# Patient Record
Sex: FEMALE | Race: BLACK OR AFRICAN AMERICAN | NOT HISPANIC OR LATINO | URBAN - METROPOLITAN AREA
[De-identification: names, ages, dates, MRNs, and addresses within clinical notes are randomized per-mention and may not be internally consistent; named-entity substitution may affect disease eponyms.]

---

## 2023-12-30 ENCOUNTER — INPATIENT (INPATIENT)
Facility: HOSPITAL | Age: 28
LOS: 0 days | Discharge: HOME | DRG: 832 | End: 2023-12-31
Attending: OBSTETRICS & GYNECOLOGY | Admitting: OBSTETRICS & GYNECOLOGY
Payer: MEDICAID

## 2023-12-30 VITALS
OXYGEN SATURATION: 100 % | HEART RATE: 85 BPM | TEMPERATURE: 98 F | SYSTOLIC BLOOD PRESSURE: 99 MMHG | RESPIRATION RATE: 17 BRPM | DIASTOLIC BLOOD PRESSURE: 59 MMHG

## 2023-12-30 DIAGNOSIS — D64.9 ANEMIA, UNSPECIFIED: ICD-10-CM

## 2023-12-30 DIAGNOSIS — E46 UNSPECIFIED PROTEIN-CALORIE MALNUTRITION: ICD-10-CM

## 2023-12-30 DIAGNOSIS — O09.299 SUPERVISION OF PREGNANCY WITH OTHER POOR REPRODUCTIVE OR OBSTETRIC HISTORY, UNSPECIFIED TRIMESTER: ICD-10-CM

## 2023-12-30 DIAGNOSIS — O99.019 ANEMIA COMPLICATING PREGNANCY, UNSPECIFIED TRIMESTER: ICD-10-CM

## 2023-12-30 DIAGNOSIS — Z3A.25 25 WEEKS GESTATION OF PREGNANCY: ICD-10-CM

## 2023-12-30 DIAGNOSIS — Z98.890 OTHER SPECIFIED POSTPROCEDURAL STATES: Chronic | ICD-10-CM

## 2023-12-30 DIAGNOSIS — O26.899 OTHER SPECIFIED PREGNANCY RELATED CONDITIONS, UNSPECIFIED TRIMESTER: ICD-10-CM

## 2023-12-30 DIAGNOSIS — Z34.80 ENCOUNTER FOR SUPERVISION OF OTHER NORMAL PREGNANCY, UNSPECIFIED TRIMESTER: ICD-10-CM

## 2023-12-30 LAB
ABO RH CONFIRMATION: SIGNIFICANT CHANGE UP
ABO RH CONFIRMATION: SIGNIFICANT CHANGE UP
ALBUMIN SERPL ELPH-MCNC: 2.7 G/DL — LOW (ref 3.5–5)
ALBUMIN SERPL ELPH-MCNC: 2.7 G/DL — LOW (ref 3.5–5)
ALP SERPL-CCNC: 51 U/L — SIGNIFICANT CHANGE UP (ref 40–120)
ALP SERPL-CCNC: 51 U/L — SIGNIFICANT CHANGE UP (ref 40–120)
ALT FLD-CCNC: 9 U/L DA — LOW (ref 10–60)
ALT FLD-CCNC: 9 U/L DA — LOW (ref 10–60)
ANION GAP SERPL CALC-SCNC: 8 MMOL/L — SIGNIFICANT CHANGE UP (ref 5–17)
ANION GAP SERPL CALC-SCNC: 8 MMOL/L — SIGNIFICANT CHANGE UP (ref 5–17)
ANISOCYTOSIS BLD QL: SIGNIFICANT CHANGE UP
ANISOCYTOSIS BLD QL: SIGNIFICANT CHANGE UP
APPEARANCE UR: ABNORMAL
APPEARANCE UR: ABNORMAL
APTT BLD: 24.3 SEC — LOW (ref 24.5–35.6)
APTT BLD: 24.3 SEC — LOW (ref 24.5–35.6)
AST SERPL-CCNC: 14 U/L — SIGNIFICANT CHANGE UP (ref 10–40)
AST SERPL-CCNC: 14 U/L — SIGNIFICANT CHANGE UP (ref 10–40)
BACTERIA # UR AUTO: ABNORMAL /HPF
BACTERIA # UR AUTO: ABNORMAL /HPF
BASOPHILS # BLD AUTO: 0.02 K/UL — SIGNIFICANT CHANGE UP (ref 0–0.2)
BASOPHILS # BLD AUTO: 0.02 K/UL — SIGNIFICANT CHANGE UP (ref 0–0.2)
BASOPHILS NFR BLD AUTO: 0.3 % — SIGNIFICANT CHANGE UP (ref 0–2)
BASOPHILS NFR BLD AUTO: 0.3 % — SIGNIFICANT CHANGE UP (ref 0–2)
BILIRUB SERPL-MCNC: 0.4 MG/DL — SIGNIFICANT CHANGE UP (ref 0.2–1.2)
BILIRUB SERPL-MCNC: 0.4 MG/DL — SIGNIFICANT CHANGE UP (ref 0.2–1.2)
BILIRUB UR-MCNC: NEGATIVE — SIGNIFICANT CHANGE UP
BILIRUB UR-MCNC: NEGATIVE — SIGNIFICANT CHANGE UP
BLD GP AB SCN SERPL QL: SIGNIFICANT CHANGE UP
BLD GP AB SCN SERPL QL: SIGNIFICANT CHANGE UP
BUN SERPL-MCNC: 4 MG/DL — LOW (ref 7–18)
BUN SERPL-MCNC: 4 MG/DL — LOW (ref 7–18)
CALCIUM SERPL-MCNC: 8.3 MG/DL — LOW (ref 8.4–10.5)
CALCIUM SERPL-MCNC: 8.3 MG/DL — LOW (ref 8.4–10.5)
CHLORIDE SERPL-SCNC: 108 MMOL/L — SIGNIFICANT CHANGE UP (ref 96–108)
CHLORIDE SERPL-SCNC: 108 MMOL/L — SIGNIFICANT CHANGE UP (ref 96–108)
CO2 SERPL-SCNC: 21 MMOL/L — LOW (ref 22–31)
CO2 SERPL-SCNC: 21 MMOL/L — LOW (ref 22–31)
COLOR SPEC: YELLOW — SIGNIFICANT CHANGE UP
COLOR SPEC: YELLOW — SIGNIFICANT CHANGE UP
CREAT SERPL-MCNC: 0.42 MG/DL — LOW (ref 0.5–1.3)
CREAT SERPL-MCNC: 0.42 MG/DL — LOW (ref 0.5–1.3)
DIFF PNL FLD: NEGATIVE — SIGNIFICANT CHANGE UP
DIFF PNL FLD: NEGATIVE — SIGNIFICANT CHANGE UP
EGFR: 137 ML/MIN/1.73M2 — SIGNIFICANT CHANGE UP
EGFR: 137 ML/MIN/1.73M2 — SIGNIFICANT CHANGE UP
EOSINOPHIL # BLD AUTO: 0.07 K/UL — SIGNIFICANT CHANGE UP (ref 0–0.5)
EOSINOPHIL # BLD AUTO: 0.07 K/UL — SIGNIFICANT CHANGE UP (ref 0–0.5)
EOSINOPHIL NFR BLD AUTO: 1.1 % — SIGNIFICANT CHANGE UP (ref 0–6)
EOSINOPHIL NFR BLD AUTO: 1.1 % — SIGNIFICANT CHANGE UP (ref 0–6)
EPI CELLS # UR: PRESENT
EPI CELLS # UR: PRESENT
FIBRINOGEN PPP-MCNC: 305 MG/DL — SIGNIFICANT CHANGE UP (ref 200–475)
FIBRINOGEN PPP-MCNC: 305 MG/DL — SIGNIFICANT CHANGE UP (ref 200–475)
GLUCOSE SERPL-MCNC: 68 MG/DL — LOW (ref 70–99)
GLUCOSE SERPL-MCNC: 68 MG/DL — LOW (ref 70–99)
GLUCOSE UR QL: NEGATIVE MG/DL — SIGNIFICANT CHANGE UP
GLUCOSE UR QL: NEGATIVE MG/DL — SIGNIFICANT CHANGE UP
HCT VFR BLD CALC: 25.7 % — LOW (ref 34.5–45)
HCT VFR BLD CALC: 25.7 % — LOW (ref 34.5–45)
HGB BLD-MCNC: 7 G/DL — CRITICAL LOW (ref 11.5–15.5)
HGB BLD-MCNC: 7 G/DL — CRITICAL LOW (ref 11.5–15.5)
HYPOCHROMIA BLD QL: SLIGHT — SIGNIFICANT CHANGE UP
HYPOCHROMIA BLD QL: SLIGHT — SIGNIFICANT CHANGE UP
IMM GRANULOCYTES NFR BLD AUTO: 0.3 % — SIGNIFICANT CHANGE UP (ref 0–0.9)
IMM GRANULOCYTES NFR BLD AUTO: 0.3 % — SIGNIFICANT CHANGE UP (ref 0–0.9)
INR BLD: 0.94 RATIO — SIGNIFICANT CHANGE UP (ref 0.85–1.18)
INR BLD: 0.94 RATIO — SIGNIFICANT CHANGE UP (ref 0.85–1.18)
KETONES UR-MCNC: 15 MG/DL
KETONES UR-MCNC: 15 MG/DL
LEUKOCYTE ESTERASE UR-ACNC: ABNORMAL
LEUKOCYTE ESTERASE UR-ACNC: ABNORMAL
LYMPHOCYTES # BLD AUTO: 1.86 K/UL — SIGNIFICANT CHANGE UP (ref 1–3.3)
LYMPHOCYTES # BLD AUTO: 1.86 K/UL — SIGNIFICANT CHANGE UP (ref 1–3.3)
LYMPHOCYTES # BLD AUTO: 29.7 % — SIGNIFICANT CHANGE UP (ref 13–44)
LYMPHOCYTES # BLD AUTO: 29.7 % — SIGNIFICANT CHANGE UP (ref 13–44)
MACROCYTES BLD QL: SLIGHT — SIGNIFICANT CHANGE UP
MACROCYTES BLD QL: SLIGHT — SIGNIFICANT CHANGE UP
MANUAL SMEAR VERIFICATION: SIGNIFICANT CHANGE UP
MANUAL SMEAR VERIFICATION: SIGNIFICANT CHANGE UP
MCHC RBC-ENTMCNC: 18 PG — LOW (ref 27–34)
MCHC RBC-ENTMCNC: 18 PG — LOW (ref 27–34)
MCHC RBC-ENTMCNC: 27.2 GM/DL — LOW (ref 32–36)
MCHC RBC-ENTMCNC: 27.2 GM/DL — LOW (ref 32–36)
MCV RBC AUTO: 66.1 FL — LOW (ref 80–100)
MCV RBC AUTO: 66.1 FL — LOW (ref 80–100)
MICROCYTES BLD QL: SIGNIFICANT CHANGE UP
MICROCYTES BLD QL: SIGNIFICANT CHANGE UP
MONOCYTES # BLD AUTO: 0.64 K/UL — SIGNIFICANT CHANGE UP (ref 0–0.9)
MONOCYTES # BLD AUTO: 0.64 K/UL — SIGNIFICANT CHANGE UP (ref 0–0.9)
MONOCYTES NFR BLD AUTO: 10.2 % — SIGNIFICANT CHANGE UP (ref 2–14)
MONOCYTES NFR BLD AUTO: 10.2 % — SIGNIFICANT CHANGE UP (ref 2–14)
NEUTROPHILS # BLD AUTO: 3.66 K/UL — SIGNIFICANT CHANGE UP (ref 1.8–7.4)
NEUTROPHILS # BLD AUTO: 3.66 K/UL — SIGNIFICANT CHANGE UP (ref 1.8–7.4)
NEUTROPHILS NFR BLD AUTO: 58.4 % — SIGNIFICANT CHANGE UP (ref 43–77)
NEUTROPHILS NFR BLD AUTO: 58.4 % — SIGNIFICANT CHANGE UP (ref 43–77)
NITRITE UR-MCNC: NEGATIVE — SIGNIFICANT CHANGE UP
NITRITE UR-MCNC: NEGATIVE — SIGNIFICANT CHANGE UP
NRBC # BLD: 0 /100 WBCS — SIGNIFICANT CHANGE UP (ref 0–0)
NRBC # BLD: 0 /100 WBCS — SIGNIFICANT CHANGE UP (ref 0–0)
OVALOCYTES BLD QL SMEAR: SLIGHT — SIGNIFICANT CHANGE UP
OVALOCYTES BLD QL SMEAR: SLIGHT — SIGNIFICANT CHANGE UP
PH UR: 7.5 — SIGNIFICANT CHANGE UP (ref 5–8)
PH UR: 7.5 — SIGNIFICANT CHANGE UP (ref 5–8)
PLAT MORPH BLD: NORMAL — SIGNIFICANT CHANGE UP
PLAT MORPH BLD: NORMAL — SIGNIFICANT CHANGE UP
PLATELET # BLD AUTO: 256 K/UL — SIGNIFICANT CHANGE UP (ref 150–400)
PLATELET # BLD AUTO: 256 K/UL — SIGNIFICANT CHANGE UP (ref 150–400)
PLATELET COUNT - ESTIMATE: NORMAL — SIGNIFICANT CHANGE UP
PLATELET COUNT - ESTIMATE: NORMAL — SIGNIFICANT CHANGE UP
POIKILOCYTOSIS BLD QL AUTO: SLIGHT — SIGNIFICANT CHANGE UP
POIKILOCYTOSIS BLD QL AUTO: SLIGHT — SIGNIFICANT CHANGE UP
POTASSIUM SERPL-MCNC: 3.2 MMOL/L — LOW (ref 3.5–5.3)
POTASSIUM SERPL-MCNC: 3.2 MMOL/L — LOW (ref 3.5–5.3)
POTASSIUM SERPL-SCNC: 3.2 MMOL/L — LOW (ref 3.5–5.3)
POTASSIUM SERPL-SCNC: 3.2 MMOL/L — LOW (ref 3.5–5.3)
PROT SERPL-MCNC: 6.4 G/DL — SIGNIFICANT CHANGE UP (ref 6–8.3)
PROT SERPL-MCNC: 6.4 G/DL — SIGNIFICANT CHANGE UP (ref 6–8.3)
PROT UR-MCNC: ABNORMAL MG/DL
PROT UR-MCNC: ABNORMAL MG/DL
PROTHROM AB SERPL-ACNC: 10.7 SEC — SIGNIFICANT CHANGE UP (ref 9.5–13)
PROTHROM AB SERPL-ACNC: 10.7 SEC — SIGNIFICANT CHANGE UP (ref 9.5–13)
RBC # BLD: 3.89 M/UL — SIGNIFICANT CHANGE UP (ref 3.8–5.2)
RBC # BLD: 3.89 M/UL — SIGNIFICANT CHANGE UP (ref 3.8–5.2)
RBC # FLD: 17.9 % — HIGH (ref 10.3–14.5)
RBC # FLD: 17.9 % — HIGH (ref 10.3–14.5)
RBC BLD AUTO: ABNORMAL
RBC BLD AUTO: ABNORMAL
RBC CASTS # UR COMP ASSIST: 2 /HPF — SIGNIFICANT CHANGE UP (ref 0–4)
RBC CASTS # UR COMP ASSIST: 2 /HPF — SIGNIFICANT CHANGE UP (ref 0–4)
SODIUM SERPL-SCNC: 137 MMOL/L — SIGNIFICANT CHANGE UP (ref 135–145)
SODIUM SERPL-SCNC: 137 MMOL/L — SIGNIFICANT CHANGE UP (ref 135–145)
SP GR SPEC: 1.02 — SIGNIFICANT CHANGE UP (ref 1–1.03)
SP GR SPEC: 1.02 — SIGNIFICANT CHANGE UP (ref 1–1.03)
UROBILINOGEN FLD QL: 1 MG/DL — SIGNIFICANT CHANGE UP (ref 0.2–1)
UROBILINOGEN FLD QL: 1 MG/DL — SIGNIFICANT CHANGE UP (ref 0.2–1)
WBC # BLD: 6.27 K/UL — SIGNIFICANT CHANGE UP (ref 3.8–10.5)
WBC # BLD: 6.27 K/UL — SIGNIFICANT CHANGE UP (ref 3.8–10.5)
WBC # FLD AUTO: 6.27 K/UL — SIGNIFICANT CHANGE UP (ref 3.8–10.5)
WBC # FLD AUTO: 6.27 K/UL — SIGNIFICANT CHANGE UP (ref 3.8–10.5)
WBC UR QL: 8 /HPF — HIGH (ref 0–5)
WBC UR QL: 8 /HPF — HIGH (ref 0–5)

## 2023-12-30 PROCEDURE — 76817 TRANSVAGINAL US OBSTETRIC: CPT | Mod: 26

## 2023-12-30 PROCEDURE — 76819 FETAL BIOPHYS PROFIL W/O NST: CPT | Mod: 26

## 2023-12-30 RX ORDER — POTASSIUM CHLORIDE 20 MEQ
40 PACKET (EA) ORAL EVERY 4 HOURS
Refills: 0 | Status: COMPLETED | OUTPATIENT
Start: 2023-12-30 | End: 2023-12-31

## 2023-12-30 RX ORDER — SODIUM CHLORIDE 9 MG/ML
1000 INJECTION, SOLUTION INTRAVENOUS
Refills: 0 | Status: DISCONTINUED | OUTPATIENT
Start: 2023-12-30 | End: 2023-12-30

## 2023-12-30 RX ORDER — SODIUM CHLORIDE 9 MG/ML
1000 INJECTION, SOLUTION INTRAVENOUS ONCE
Refills: 0 | Status: COMPLETED | OUTPATIENT
Start: 2023-12-30 | End: 2023-12-30

## 2023-12-30 RX ORDER — ACETAMINOPHEN 500 MG
975 TABLET ORAL ONCE
Refills: 0 | Status: COMPLETED | OUTPATIENT
Start: 2023-12-30 | End: 2023-12-30

## 2023-12-30 RX ORDER — SODIUM CHLORIDE 9 MG/ML
1000 INJECTION, SOLUTION INTRAVENOUS ONCE
Refills: 0 | Status: DISCONTINUED | OUTPATIENT
Start: 2023-12-30 | End: 2023-12-30

## 2023-12-30 RX ADMIN — Medication 975 MILLIGRAM(S): at 21:50

## 2023-12-30 RX ADMIN — Medication 975 MILLIGRAM(S): at 22:50

## 2023-12-30 RX ADMIN — SODIUM CHLORIDE 1000 MILLILITER(S): 9 INJECTION, SOLUTION INTRAVENOUS at 17:45

## 2023-12-30 RX ADMIN — Medication 40 MILLIEQUIVALENT(S): at 22:07

## 2023-12-30 NOTE — PROGRESS NOTE ADULT - SUBJECTIVE AND OBJECTIVE BOX
HD#1 admitted antepartum  - for blood transfusion x2 units due to THERESA during pregnancy  has not seen heme outpt    - pt seen at bedside no evidence of labor pains/vag bleeding/LOF  reports +FM    doing well    Vital Signs Last 24 Hrs  T(C): 36.8 (30 Dec 2023 16:37), Max: 36.8 (30 Dec 2023 16:37)  T(F): 98.2 (30 Dec 2023 16:37), Max: 98.2 (30 Dec 2023 16:37)  HR: 85 (30 Dec 2023 16:37) (85 - 99)  BP: 99/59 (30 Dec 2023 16:37) (99/59 - 104/67)  BP(mean): --  RR: 17 (30 Dec 2023 16:37) (17 - 18)  SpO2: 100% (30 Dec 2023 16:37) (100% - 100%)    Parameters below as of 30 Dec 2023 16:37  Patient On (Oxygen Delivery Method): room air

## 2023-12-30 NOTE — OB PROVIDER TRIAGE NOTE - NSOBPROVIDERNOTE_OBGYN_ALL_OB_FT
27yo  @25.5 weeks, DANIEL 24, LMP 23 presenting with symptomatic anemia, pt stable   - NST reactive,  testing reassuring  - BPP, OB transvaginal for cervical length   - cbc, coags, cmp, type and screen, ua and urine culture   - cont close maternal and fetal monitoring  - discussed the importance of patient being evaluated by a hematologist for full workup and likely venofer infusions  - gave patient information for [facility] clinic   - fall risk, pt to ambulate with assistance  - ivf hydration   - d/w dr. Guevara, annabel attending 27yo  @25.5 weeks, DANIEL 24, LMP 23 presenting with symptomatic anemia, pt stable   - NST reactive,  testing reassuring  - BPP, OB transvaginal for cervical length   - cbc, coags, cmp, type and screen, ua and urine culture   - cont close maternal and fetal monitoring  - discussed the importance of patient being evaluated by a hematologist for full workup and likely venofer infusions  - gave patient information for [facility] clinic   - fall risk, pt to ambulate with assistance  - ivf hydration   - d/w dr. Guevara, annabel attending 29yo  @25.5 weeks, DANIEL 24, LMP 23 presenting with symptomatic anemia, h/o poor ob outcome (IUFD ), pt stable   - NST reactive,  testing reassuring  - BPP, OB transvaginal for cervical length   - cbc, coags, cmp, type and screen, ua and urine culture   - h/o electrolyte abnormalities probably secondary to poor po intake and protein calorie malnutrition   - cont close maternal and fetal monitoring  - discussed the importance of patient being evaluated by a hematologist for full workup and likely venofer infusions  - gave patient information for [facility] clinic   - fall risk, pt to ambulate with assistance  - ivf hydration   - d/w dr. Guevara, house attending 27yo  @25.5 weeks, DANIEL 24, LMP 23 presenting with symptomatic anemia, h/o poor ob outcome (IUFD ), pt stable   - NST reactive,  testing reassuring  - BPP, OB transvaginal for cervical length   - cbc, coags, cmp, type and screen, ua and urine culture   - h/o electrolyte abnormalities probably secondary to poor po intake and protein calorie malnutrition   - cont close maternal and fetal monitoring  - discussed the importance of patient being evaluated by a hematologist for full workup and likely venofer infusions  - gave [facility]   - fall risk, pt to ambulate with assistance  - ivf hydration   - d/w dr. Guevara, house attending

## 2023-12-30 NOTE — OB PROVIDER H&P - HISTORY OF PRESENT ILLNESS
29yo  @25.5 weeks, DANIEL 24, LMP 23 presenting to triage because she is feeling "woozy." Patient has a history of chronic anemia with hemoglobin fluctuating between 6.5-7.5 on po ferrous sulfate BID, never saw hematology. She was called on thursday and instructed that her hemoglobin is 7 and if she develops any signs/symptoms of anemia to go directly to the hospital. She states that all day today she is feeling intermittent dizziness, shortness of breath, palpitations and is extremely tired. Denies loss of consciousness or falling. She has also had a decreased appetite and forces herself to eat at least once a day, last meal last night. Reports that she is constantly thirsty and drinks about 4 large seven cups daily. Admits to feeling abdominal tightening intermittently with walking, not painful.  Admits to normal fetal movement. Denies vaginal bleeding, leakage of fluid, contractions or any other concerns  Denies fever, chills, headache, visual changes, chest pain, abdominal/pelvic pain, lower extremity pain  or swelling     PNC: [facility] - chronic anemia not followed by hematology, hemoglobin fluctuates between 6-7  OBHx:   (1) 2012 , female, full term, 7lb, uncomplicated   (2) 1/3/2018 , male, full term, 7lb, mild anemia otherwise uncomplicated   (3)  IUFD @25wks due to severe anemia, s/p vaginal delivery and d&c for retained placenta, did not receive blood transfusion because was asymptomatic at that time, otherwise uncomplicated  (4) 3/22/2021 , male, full term, 8lb 11oz, complicated by acute on chronic anemia requiring 2u prbc blood transfusion postpartum, otherwise uncomplicated   GYNHx: denies fibroids, cyst, sti, abnormal paps   PMHx: chronic anemia   Meds: iron bid, pnv (denies ever receiving venofer)  Allergies: nka   PSHx: d&c as noted above   SocialHx: denies h/o tobacco/alcohol/drug use   PsychHx: denies h/o anxiety, depression, ptsd, trauma or abuse     Ht: 5'1   Wt: 140 (weight before pregnancy 150)  reviewed limited prenatal labs on patients phone   cbc 23: 6/7.5/27.5/301  cmp notable for K 3.3, hypophosphatemia, hypoalbuminemia, hypomagnesemia, hypoCalcemia  27yo  @25.5 weeks, DANIEL 24, LMP 23 presenting to triage because she is feeling "woozy." Patient has a history of chronic anemia with hemoglobin fluctuating between 6.5-7.5 on po ferrous sulfate BID, never saw hematology. She was called on thursday and instructed that her hemoglobin is 7 and if she develops any signs/symptoms of anemia to go directly to the hospital. She states that all day today she is feeling intermittent dizziness, shortness of breath, palpitations and is extremely tired. Denies loss of consciousness or falling. She has also had a decreased appetite and forces herself to eat at least once a day, last meal last night. Reports that she is constantly thirsty and drinks about 4 large seven cups daily. Admits to feeling abdominal tightening intermittently with walking, not painful.  Admits to normal fetal movement. Denies vaginal bleeding, leakage of fluid, contractions or any other concerns  Denies fever, chills, headache, visual changes, chest pain, abdominal/pelvic pain, lower extremity pain  or swelling     PNC: [facility] - chronic anemia not followed by hematology, hemoglobin fluctuates between 6-7  OBHx:   (1) 2012 , female, full term, 7lb, uncomplicated   (2) 1/3/2018 , male, full term, 7lb, mild anemia otherwise uncomplicated   (3)  IUFD @25wks due to severe anemia, s/p vaginal delivery and d&c for retained placenta, did not receive blood transfusion because was asymptomatic at that time, otherwise uncomplicated  (4) 3/22/2021 , male, full term, 8lb 11oz, complicated by acute on chronic anemia requiring 2u prbc blood transfusion postpartum, otherwise uncomplicated   GYNHx: denies fibroids, cyst, sti, abnormal paps   PMHx: chronic anemia   Meds: iron bid, pnv (denies ever receiving venofer)  Allergies: nka   PSHx: d&c as noted above   SocialHx: denies h/o tobacco/alcohol/drug use   PsychHx: denies h/o anxiety, depression, ptsd, trauma or abuse     Ht: 5'1   Wt: 140 (weight before pregnancy 150)  reviewed limited prenatal labs on patients phone   cbc 23: 6/7.5/27.5/301  cmp notable for K 3.3, hypophosphatemia, hypoalbuminemia, hypomagnesemia, hypoCalcemia

## 2023-12-30 NOTE — OB PROVIDER H&P - ABORTIONS, OB PROFILE
Procedure:    [] Facet Joint Injection           Post Procedure Instructions:   Activity:  • DO NOT work, drive a car, stay alone, or operate machinery or electrical/power tools or appliances today.  • Activity as tolerated.  • Resume your pre-procedure activity or restrictions tomorrow.    Dressing/Incision Site:   • Keep injection site clean and dry.  • You may shower/bathe tomorrow.  • You may use an ice pack at the injection site for the next 24 hours while awake.  The ice pack should only be used for 20 minutes every 2 hours.    Special Instructions:   • DO NOT DRINK ALCOHOL TODAY due to the medication given during the procedure.  • Resume your pre-procedure diet.  • Continue your medications unless otherwise instructed.  · You will most likely have continued pain after the procedure; continue your usual pain medications unless otherwise instructed.  · [] For Diabetic Steroid Injection Patients:  Diabetic patients may experience higher glucose levels after a steroid injection.  Diabetic patients should monitor their blood sugar for at least one week after injection.  Insulin or medication adjustment may be necessary.  Please contact your primary care provider with any questions/concerns.  ·   Call [phone] if you develop any of the following:  • Drainage, increase in redness, and/or swelling from the injection site.  • Temperature above 101oF.  • Increased numbness or weakness of your legs or arms different than before the injection.  • Severe headache.                    
Bilateral
1

## 2023-12-30 NOTE — OB RN TRIAGE NOTE - FALL HARM RISK - UNIVERSAL INTERVENTIONS
Bed in lowest position, wheels locked, appropriate side rails in place/Call bell, personal items and telephone in reach/Instruct patient to call for assistance before getting out of bed or chair/Non-slip footwear when patient is out of bed/[place] to call system/Physically safe environment - no spills, clutter or unnecessary equipment/Purposeful Proactive Rounding/Room/bathroom lighting operational, light cord in reach Bed in lowest position, wheels locked, appropriate side rails in place/Call bell, personal items and telephone in reach/Instruct patient to call for assistance before getting out of bed or chair/Non-slip footwear when patient is out of bed/[place] to call system/Physically safe environment - no spills, clutter or unnecessary equipment/Purposeful Proactive Rounding/Room/bathroom lighting operational, light cord in reach

## 2023-12-30 NOTE — OB PROVIDER H&P - ASSESSMENT
27yo  @25.5 weeks, DANIEL 24, LMP 23 presenting with symptomatic anemia, h/o poor ob outcome (IUFD ), pt stable   - NST reactive,  testing reassuring  - BPP   - 2 units prbc ordered for symptomatic anemia, blood consent obtained  - cmp notable for hypokalemia po potassium ordered  - regular diet   - h/o electrolyte abnormalities probably secondary to poor po intake and protein calorie malnutrition - an extensive conversation was had in regards to the minimum calorie requirements during pregnancy and patient was encouraged to eat small frwuent meals and hydrate   - cont close maternal and fetal monitoring  - discussed the importance of patient being evaluated by a hematologist for full workup and likely venofer infusions  - gave patient information for [facility] clinic   - fall risk, pt to ambulate with assistance  - ivf hydration   - d/w dr. Guevara, annabel attending   29yo  @25.5 weeks, DANIEL 24, LMP 23 presenting with symptomatic anemia, h/o poor ob outcome (IUFD ), pt stable   - NST reactive,  testing reassuring  - BPP   - 2 units prbc ordered for symptomatic anemia, blood consent obtained  - cmp notable for hypokalemia po potassium ordered  - regular diet   - h/o electrolyte abnormalities probably secondary to poor po intake and protein calorie malnutrition - an extensive conversation was had in regards to the minimum calorie requirements during pregnancy and patient was encouraged to eat small frwuent meals and hydrate   - cont close maternal and fetal monitoring  - discussed the importance of patient being evaluated by a hematologist for full workup and likely venofer infusions  - gave patient information for [facility] clinic   - fall risk, pt to ambulate with assistance  - ivf hydration   - d/w dr. Guevara, annabel attending

## 2023-12-30 NOTE — OB PROVIDER H&P - NSHPLABSRESULTS_GEN_ALL_CORE
Hemoglobin: 7.0: TYPE:(C=Critical, N=Notification, A=Abnormal) C   < from: US Fetal Bio Profile w/o Non-Stress (12.30.23 @ 17:46) >    Single live Intrauterine gestation is present.  Fetal position: Variable presentation.  Placenta location: Anterior.  Amniotic fluid volume: DAYAMI 19.2 cm.  Cervical length: 5.2 cm by transvaginal technique.  Fetal motion is seen in real-time and the fetal heart rate measures 162   bpm.    Fetal anatomy and umbilical cord were not evaluated.    Fetal biometry not performed.    The biophysical profile is 8/8.    Additional comments: None.    IMPRESSION:  Single live intrauterine gestation.  Biophysical profile score is 8 out of 8.  Cervical length is 5.2 cm.    Please note that this study was not optimized for the evaluation of fetal  anatomy and umbilical cord which should be performed on a separate basis   as clinically indicated.  Labs: cbc, coags, cmp, type and screen, ferritin, UA, Urine culture

## 2023-12-30 NOTE — OB PROVIDER TRIAGE NOTE - NSHPPHYSICALEXAM_GEN_ALL_CORE
gen: aox3, nad, pallor, appears well but tired   abd: gravid, soft, nontender, no guarding or rigidity  sve: deferred   ext: no edema, no calf tenderness    fht baseline 140, moderate variability, appropriate for gestational age   toco none     Vital Signs Last 24 Hrs  T(F): 97.9 (12-30-23 @ 16:02), Max: 97.9 (12-30-23 @ 16:02)  HR: 85 (12-30-23 @ 16:37) (85 - 99)  BP: 99/59 (12-30-23 @ 16:37) (99/59 - 104/67)  RR: 17 (12-30-23 @ 16:37) (17 - 18)  SpO2: 100% (12-30-23 @ 16:37) (100% - 100%)

## 2023-12-30 NOTE — OB RN TRIAGE NOTE - CHIEF COMPLAINT QUOTE
my doctor sent me in because my hemoglobin is not going up. and she said she is concerned that im pale

## 2023-12-30 NOTE — PROGRESS NOTE ADULT - SUBJECTIVE AND OBJECTIVE BOX
Progress Note:   · Provider Specialty	OB      · Subjective and Objective:   HD#1 admitted antepartum  - for blood transfusion x2 units due to THERESA during pregnancy  has not seen heme outpt    - pt seen at bedside no evidence of labor pains/vag bleeding/LOF  reports +FM    doing well    Vital Signs Last 24 Hrs  T(C): 36.8 (30 Dec 2023 16:37), Max: 36.8 (30 Dec 2023 16:37)  T(F): 98.2 (30 Dec 2023 16:37), Max: 98.2 (30 Dec 2023 16:37)  HR: 85 (30 Dec 2023 16:37) (85 - 99)  BP: 99/59 (30 Dec 2023 16:37) (99/59 - 104/67)  BP(mean): --  RR: 17 (30 Dec 2023 16:37) (17 - 18)  SpO2: 100% (30 Dec 2023 16:37) (100% - 100%)    Parameters below as of 30 Dec 2023 16:37  Patient On (Oxygen Delivery Method): room air    pt alert and oriented x3  not in acute distress    skin warm dry +pallor  abd gravid soft NT no guarding no rebound relaxed uterus  ve deferred  extrem no edema b/l    continue blood transfusion as ordered x2 units then cbc post transfusion  potassium replacement for hypokalemia

## 2023-12-30 NOTE — OB PROVIDER H&P - NSLOWPPHRISK_OBGYN_A_OB
No previous uterine incision/[place] Pregnancy/Less than or equal to 4 previous vaginal births/No known bleeding disorder/No history of postpartum hemorrhage/No other PPH risks indicated

## 2023-12-30 NOTE — OB PROVIDER H&P - PROBLEM/PLAN-1
"Problem: Alcohol Withdrawal  Goal: Alcohol Withdrawal Symptom Control  Outcome: No Change    Patient is up and visible in the milieu. Patient is ambulating balanced and steady. Patient is alert and oriented x 4. Patient is attending/participating in unit programming. Pt is social with peers. Affect is blunted/flat, mood is calm. Patient denies SI/SIB/HI. Pt denies auditory/visual hallucinations. Patient verbally contracts for safety on the unit. Patient is slow to respond during interview, but is clear in his responses.     This RN administered the PRN medications Zofran 4mg ODT x 1 for nausea, (see MAR) at the request of the patient. Patient is tolerating medications well, denies any current side effects.     Pt's MSSA = 3, 3 this shift. Patient is not tremulous, not diaphoretic, and exhibiting minimal withdrawal symptoms at this time. Patient reports a fair appetite, and fair sleep. Patient discharge plans pending. Rest, fluids, and food encouraged. Status 15 checks remain. Patient denies any unmet needs at this time.     Blood pressure 116/78, pulse 57, temperature 97.6  F (36.4  C), temperature source Temporal, resp. rate 16, height 1.778 m (5' 10\"), weight 79.4 kg (175 lb), SpO2 97 %.     Patient has run bradycardic this shift (pulses 43, 43). Internal medicine PA, Madeleine Payan, notified. Patient pulses re-checked each time for values of (pulses 53, 57). Patient is asymptomatic at this time.   " DISPLAY PLAN FREE TEXT

## 2023-12-30 NOTE — OB PROVIDER TRIAGE NOTE - HISTORY OF PRESENT ILLNESS
27yo  @25.5 weeks, DANIEL 24, LMP 23 presenting to triage because she is feeling "woozy." Patient has a history of chronic anemia with hemoglobin fluctuating between 6.5-7.5 on po ferrous sulfate BID, never saw hematology. She was called on thursday and instructed that her hemoglobin is 7 and if she develops any signs/symptoms of anemia to go directly to the hospital. She states that all day today she is feeling intermittent dizziness, shortness of breath, palpitations and is extremely tired. Denies loss of consciousness or falling. She has also had a decreased appetite and forces herself to eat at least once a day, last meal last night. Reports that she is constantly thirsty and drinks about 4 large seven cups daily. Admits to feeling abdominal tightening intermittently with walking, not painful.  Admits to normal fetal movement. Denies vaginal bleeding, leakage of fluid, contractions or any other concerns  Denies fever, chills, headache, visual changes, chest pain, abdominal/pelvic pain, lower extremity pain  or swelling     PNC: [facility] - chronic anemia not followed by hematology, hemoglobin fluctuates between 6-7  OBHx:   (1) 2012 , female, full term, 7lb, uncomplicated   (2) 1/3/2018 , male, full term, 7lb, mild anemia otherwise uncomplicated   (3)  IUFD @25wks due to severe anemia, s/p vaginal delivery and d&c for retained placenta, did not receive blood transfusion because was asymptomatic at that time, otherwise uncomplicated  (4) 3/22/2021 , male, full term, 8lb 11oz, complicated by acute on chronic anemia requiring 2u prbc blood transfusion postpartum, otherwise uncomplicated   GYNHx: denies fibroids, cyst, sti, abnormal paps   PMHx: chronic anemia   Meds: iron bid, pnv (denies ever receiving venofer)  Allergies: nka   PSHx: d&c as noted above   SocialHx: denies h/o tobacco/alcohol/drug use   PsychHx: denies h/o anxiety, depression, ptsd, trauma or abuse     Ht: 5'1   Wt: 140 (weight before pregnancy 150)  reviewed limited prenatal labs on patients phone   cbc 23: 6/7.5/27.5/301  cmp notable for K 3.3, hypophosphatemia, hypoalbuminemia, hypomagnesemia, hypoCalcemia

## 2023-12-30 NOTE — PROGRESS NOTE ADULT - ASSESSMENT
HD#1 admitted antepartum  - for blood transfusion x2 units due to THERESA during pregnancy  has not seen heme outpt- will set up with C outpt once dc and [facility] will set up heme/mfm consult outpt     continue blood transfusion as ordered x2 units then cbc post transfusion  potassium replacement for hypokalemia  HD#1 admitted antepartum  - for blood transfusion x2 units due to THERESA during pregnancy  has not seen heme outpt- will set up with C outpt once dc and [facility] will set up heme/mfm consult outpt     continue blood transfusion as ordered x2 units then cbc post transfusion  potassium replacement for hypokalemia

## 2023-12-30 NOTE — OB PROVIDER TRIAGE NOTE - NSHPLABSRESULTS_GEN_ALL_CORE
[place]: BPP, OB transvaginal for cervical length   Labs: cbc, coags, cmp, type and screen, ferritin, UA, Urine culture

## 2023-12-31 ENCOUNTER — TRANSCRIPTION ENCOUNTER (OUTPATIENT)
Age: 28
End: 2023-12-31

## 2023-12-31 VITALS
RESPIRATION RATE: 17 BRPM | DIASTOLIC BLOOD PRESSURE: 75 MMHG | SYSTOLIC BLOOD PRESSURE: 110 MMHG | TEMPERATURE: 98 F | OXYGEN SATURATION: 100 % | HEART RATE: 78 BPM

## 2023-12-31 LAB
ALBUMIN SERPL ELPH-MCNC: 2.6 G/DL — LOW (ref 3.5–5)
ALBUMIN SERPL ELPH-MCNC: 2.6 G/DL — LOW (ref 3.5–5)
ALP SERPL-CCNC: 50 U/L — SIGNIFICANT CHANGE UP (ref 40–120)
ALP SERPL-CCNC: 50 U/L — SIGNIFICANT CHANGE UP (ref 40–120)
ALT FLD-CCNC: 12 U/L DA — SIGNIFICANT CHANGE UP (ref 10–60)
ALT FLD-CCNC: 12 U/L DA — SIGNIFICANT CHANGE UP (ref 10–60)
ANION GAP SERPL CALC-SCNC: 3 MMOL/L — LOW (ref 5–17)
ANION GAP SERPL CALC-SCNC: 3 MMOL/L — LOW (ref 5–17)
ANISOCYTOSIS BLD QL: SIGNIFICANT CHANGE UP
ANISOCYTOSIS BLD QL: SIGNIFICANT CHANGE UP
AST SERPL-CCNC: 14 U/L — SIGNIFICANT CHANGE UP (ref 10–40)
AST SERPL-CCNC: 14 U/L — SIGNIFICANT CHANGE UP (ref 10–40)
BASOPHILS # BLD AUTO: 0.02 K/UL — SIGNIFICANT CHANGE UP (ref 0–0.2)
BASOPHILS # BLD AUTO: 0.02 K/UL — SIGNIFICANT CHANGE UP (ref 0–0.2)
BASOPHILS NFR BLD AUTO: 0.3 % — SIGNIFICANT CHANGE UP (ref 0–2)
BASOPHILS NFR BLD AUTO: 0.3 % — SIGNIFICANT CHANGE UP (ref 0–2)
BILIRUB SERPL-MCNC: 0.5 MG/DL — SIGNIFICANT CHANGE UP (ref 0.2–1.2)
BILIRUB SERPL-MCNC: 0.5 MG/DL — SIGNIFICANT CHANGE UP (ref 0.2–1.2)
BUN SERPL-MCNC: 3 MG/DL — LOW (ref 7–18)
BUN SERPL-MCNC: 3 MG/DL — LOW (ref 7–18)
CALCIUM SERPL-MCNC: 8.6 MG/DL — SIGNIFICANT CHANGE UP (ref 8.4–10.5)
CALCIUM SERPL-MCNC: 8.6 MG/DL — SIGNIFICANT CHANGE UP (ref 8.4–10.5)
CHLORIDE SERPL-SCNC: 113 MMOL/L — HIGH (ref 96–108)
CHLORIDE SERPL-SCNC: 113 MMOL/L — HIGH (ref 96–108)
CO2 SERPL-SCNC: 21 MMOL/L — LOW (ref 22–31)
CO2 SERPL-SCNC: 21 MMOL/L — LOW (ref 22–31)
CREAT SERPL-MCNC: 0.37 MG/DL — LOW (ref 0.5–1.3)
CREAT SERPL-MCNC: 0.37 MG/DL — LOW (ref 0.5–1.3)
EGFR: 141 ML/MIN/1.73M2 — SIGNIFICANT CHANGE UP
EGFR: 141 ML/MIN/1.73M2 — SIGNIFICANT CHANGE UP
EOSINOPHIL # BLD AUTO: 0.07 K/UL — SIGNIFICANT CHANGE UP (ref 0–0.5)
EOSINOPHIL # BLD AUTO: 0.07 K/UL — SIGNIFICANT CHANGE UP (ref 0–0.5)
EOSINOPHIL NFR BLD AUTO: 1.2 % — SIGNIFICANT CHANGE UP (ref 0–6)
EOSINOPHIL NFR BLD AUTO: 1.2 % — SIGNIFICANT CHANGE UP (ref 0–6)
FERRITIN SERPL-MCNC: 4 NG/ML — LOW (ref 15–150)
FERRITIN SERPL-MCNC: 4 NG/ML — LOW (ref 15–150)
GLUCOSE SERPL-MCNC: 84 MG/DL — SIGNIFICANT CHANGE UP (ref 70–99)
GLUCOSE SERPL-MCNC: 84 MG/DL — SIGNIFICANT CHANGE UP (ref 70–99)
HCT VFR BLD CALC: 29.2 % — LOW (ref 34.5–45)
HCT VFR BLD CALC: 29.2 % — LOW (ref 34.5–45)
HGB BLD-MCNC: 8.6 G/DL — LOW (ref 11.5–15.5)
HGB BLD-MCNC: 8.6 G/DL — LOW (ref 11.5–15.5)
IMM GRANULOCYTES NFR BLD AUTO: 0.3 % — SIGNIFICANT CHANGE UP (ref 0–0.9)
IMM GRANULOCYTES NFR BLD AUTO: 0.3 % — SIGNIFICANT CHANGE UP (ref 0–0.9)
LG PLATELETS BLD QL AUTO: SLIGHT — SIGNIFICANT CHANGE UP
LG PLATELETS BLD QL AUTO: SLIGHT — SIGNIFICANT CHANGE UP
LYMPHOCYTES # BLD AUTO: 2.1 K/UL — SIGNIFICANT CHANGE UP (ref 1–3.3)
LYMPHOCYTES # BLD AUTO: 2.1 K/UL — SIGNIFICANT CHANGE UP (ref 1–3.3)
LYMPHOCYTES # BLD AUTO: 36.5 % — SIGNIFICANT CHANGE UP (ref 13–44)
LYMPHOCYTES # BLD AUTO: 36.5 % — SIGNIFICANT CHANGE UP (ref 13–44)
MANUAL SMEAR VERIFICATION: SIGNIFICANT CHANGE UP
MANUAL SMEAR VERIFICATION: SIGNIFICANT CHANGE UP
MCHC RBC-ENTMCNC: 20 PG — LOW (ref 27–34)
MCHC RBC-ENTMCNC: 20 PG — LOW (ref 27–34)
MCHC RBC-ENTMCNC: 29.5 GM/DL — LOW (ref 32–36)
MCHC RBC-ENTMCNC: 29.5 GM/DL — LOW (ref 32–36)
MCV RBC AUTO: 67.9 FL — LOW (ref 80–100)
MCV RBC AUTO: 67.9 FL — LOW (ref 80–100)
MICROCYTES BLD QL: SLIGHT — SIGNIFICANT CHANGE UP
MICROCYTES BLD QL: SLIGHT — SIGNIFICANT CHANGE UP
MONOCYTES # BLD AUTO: 0.6 K/UL — SIGNIFICANT CHANGE UP (ref 0–0.9)
MONOCYTES # BLD AUTO: 0.6 K/UL — SIGNIFICANT CHANGE UP (ref 0–0.9)
MONOCYTES NFR BLD AUTO: 10.4 % — SIGNIFICANT CHANGE UP (ref 2–14)
MONOCYTES NFR BLD AUTO: 10.4 % — SIGNIFICANT CHANGE UP (ref 2–14)
NEUTROPHILS # BLD AUTO: 2.95 K/UL — SIGNIFICANT CHANGE UP (ref 1.8–7.4)
NEUTROPHILS # BLD AUTO: 2.95 K/UL — SIGNIFICANT CHANGE UP (ref 1.8–7.4)
NEUTROPHILS NFR BLD AUTO: 51.3 % — SIGNIFICANT CHANGE UP (ref 43–77)
NEUTROPHILS NFR BLD AUTO: 51.3 % — SIGNIFICANT CHANGE UP (ref 43–77)
NRBC # BLD: 0 /100 WBCS — SIGNIFICANT CHANGE UP (ref 0–0)
NRBC # BLD: 0 /100 WBCS — SIGNIFICANT CHANGE UP (ref 0–0)
OVALOCYTES BLD QL SMEAR: SLIGHT — SIGNIFICANT CHANGE UP
OVALOCYTES BLD QL SMEAR: SLIGHT — SIGNIFICANT CHANGE UP
PLAT MORPH BLD: NORMAL — SIGNIFICANT CHANGE UP
PLAT MORPH BLD: NORMAL — SIGNIFICANT CHANGE UP
PLATELET # BLD AUTO: 228 K/UL — SIGNIFICANT CHANGE UP (ref 150–400)
PLATELET # BLD AUTO: 228 K/UL — SIGNIFICANT CHANGE UP (ref 150–400)
PLATELET COUNT - ESTIMATE: NORMAL — SIGNIFICANT CHANGE UP
PLATELET COUNT - ESTIMATE: NORMAL — SIGNIFICANT CHANGE UP
POIKILOCYTOSIS BLD QL AUTO: SLIGHT — SIGNIFICANT CHANGE UP
POIKILOCYTOSIS BLD QL AUTO: SLIGHT — SIGNIFICANT CHANGE UP
POLYCHROMASIA BLD QL SMEAR: SLIGHT — SIGNIFICANT CHANGE UP
POLYCHROMASIA BLD QL SMEAR: SLIGHT — SIGNIFICANT CHANGE UP
POTASSIUM SERPL-MCNC: 4.5 MMOL/L — SIGNIFICANT CHANGE UP (ref 3.5–5.3)
POTASSIUM SERPL-MCNC: 4.5 MMOL/L — SIGNIFICANT CHANGE UP (ref 3.5–5.3)
POTASSIUM SERPL-SCNC: 4.5 MMOL/L — SIGNIFICANT CHANGE UP (ref 3.5–5.3)
POTASSIUM SERPL-SCNC: 4.5 MMOL/L — SIGNIFICANT CHANGE UP (ref 3.5–5.3)
PROT SERPL-MCNC: 6 G/DL — SIGNIFICANT CHANGE UP (ref 6–8.3)
PROT SERPL-MCNC: 6 G/DL — SIGNIFICANT CHANGE UP (ref 6–8.3)
RBC # BLD: 4.3 M/UL — SIGNIFICANT CHANGE UP (ref 3.8–5.2)
RBC # BLD: 4.3 M/UL — SIGNIFICANT CHANGE UP (ref 3.8–5.2)
RBC # FLD: 21.5 % — HIGH (ref 10.3–14.5)
RBC # FLD: 21.5 % — HIGH (ref 10.3–14.5)
RBC BLD AUTO: ABNORMAL
RBC BLD AUTO: ABNORMAL
SODIUM SERPL-SCNC: 137 MMOL/L — SIGNIFICANT CHANGE UP (ref 135–145)
SODIUM SERPL-SCNC: 137 MMOL/L — SIGNIFICANT CHANGE UP (ref 135–145)
TOXIC GRANULES BLD QL SMEAR: PRESENT — SIGNIFICANT CHANGE UP
TOXIC GRANULES BLD QL SMEAR: PRESENT — SIGNIFICANT CHANGE UP
WBC # BLD: 5.76 K/UL — SIGNIFICANT CHANGE UP (ref 3.8–10.5)
WBC # BLD: 5.76 K/UL — SIGNIFICANT CHANGE UP (ref 3.8–10.5)
WBC # FLD AUTO: 5.76 K/UL — SIGNIFICANT CHANGE UP (ref 3.8–10.5)
WBC # FLD AUTO: 5.76 K/UL — SIGNIFICANT CHANGE UP (ref 3.8–10.5)

## 2023-12-31 PROCEDURE — 86900 BLOOD TYPING SEROLOGIC ABO: CPT

## 2023-12-31 PROCEDURE — 36430 TRANSFUSION BLD/BLD COMPNT: CPT

## 2023-12-31 PROCEDURE — 82728 ASSAY OF FERRITIN: CPT

## 2023-12-31 PROCEDURE — 85384 FIBRINOGEN ACTIVITY: CPT

## 2023-12-31 PROCEDURE — 86901 BLOOD TYPING SEROLOGIC RH(D): CPT

## 2023-12-31 PROCEDURE — P9040: CPT

## 2023-12-31 PROCEDURE — 85025 COMPLETE CBC W/AUTO DIFF WBC: CPT

## 2023-12-31 PROCEDURE — 36415 COLL VENOUS BLD VENIPUNCTURE: CPT

## 2023-12-31 PROCEDURE — 87086 URINE CULTURE/COLONY COUNT: CPT

## 2023-12-31 PROCEDURE — 86850 RBC ANTIBODY SCREEN: CPT

## 2023-12-31 PROCEDURE — 81001 URINALYSIS AUTO W/SCOPE: CPT

## 2023-12-31 PROCEDURE — 80053 COMPREHEN METABOLIC PANEL: CPT

## 2023-12-31 PROCEDURE — 85610 PROTHROMBIN TIME: CPT

## 2023-12-31 PROCEDURE — 76819 FETAL BIOPHYS PROFIL W/O NST: CPT

## 2023-12-31 PROCEDURE — 76817 TRANSVAGINAL US OBSTETRIC: CPT

## 2023-12-31 PROCEDURE — 86923 COMPATIBILITY TEST ELECTRIC: CPT

## 2023-12-31 PROCEDURE — 85730 THROMBOPLASTIN TIME PARTIAL: CPT

## 2023-12-31 RX ADMIN — Medication 40 MILLIEQUIVALENT(S): at 02:44

## 2023-12-31 RX ADMIN — Medication 40 MILLIEQUIVALENT(S): at 06:47

## 2023-12-31 NOTE — PROGRESS NOTE ADULT - SUBJECTIVE AND OBJECTIVE BOX
Patient seen resting comfortably.  States that she feels better after the 2 units PRBC with less SOB and fatigue.  Reports good fetal movement, no abdominal or pelvic pain, no vaginal bleeding, or any other concerns.      Vital Signs Last 24 Hrs  T(C): 36.8 (31 Dec 2023 08:00), Max: 36.9 (31 Dec 2023 00:00)  T(F): 98.3 (31 Dec 2023 08:00), Max: 98.5 (31 Dec 2023 00:00)  HR: 78 (31 Dec 2023 08:00) (78 - 99)  BP: 110/75 (31 Dec 2023 08:00) (92/54 - 110/75)  RR: 17 (31 Dec 2023 08:00) (16 - 18)  SpO2: 100% (31 Dec 2023 08:00) (99% - 100%)    Parameters below as of 31 Dec 2023 08:00  Patient On (Oxygen Delivery Method): room air    Gen: A&Ox3, NAD  Chest: CTABL   Cardiac: S1+S2+ RRR  Abdomen: Soft, nontender, +BS, no guarding, no rebound, gravid uterus  Extremities: Nontender, no worsening edema, DTRS 2+                            8.6    5.76  )-----------( 228      ( 31 Dec 2023 06:50 )             29.2     12-31    137  |  113<H>  |  3<L>  ----------------------------<  84  4.5   |  21<L>  |  0.37<L>    Ca    8.6      31 Dec 2023 08:58    TPro  6.0  /  Alb  2.6<L>  /  TBili  0.5  /  DBili  x   /  AST  14  /  ALT  12  /  AlkPhos  50  12-31    NST: Baseline 150, moderate variability +accels, no decels  [place]: No contractions    Patient seen resting comfortably.  States that she feels better after the 2 units PRBC with less SOB and fatigue.  Reports good fetal movement, no abdominal or pelvic pain, no vaginal bleeding, or any other concerns.      Vital Signs Last 24 Hrs  T(C): 36.8 (31 Dec 2023 08:00), Max: 36.9 (31 Dec 2023 00:00)  T(F): 98.3 (31 Dec 2023 08:00), Max: 98.5 (31 Dec 2023 00:00)  HR: 78 (31 Dec 2023 08:00) (78 - 99)  BP: 110/75 (31 Dec 2023 08:00) (92/54 - 110/75)  RR: 17 (31 Dec 2023 08:00) (16 - 18)  SpO2: 100% (31 Dec 2023 08:00) (99% - 100%)    Parameters below as of 31 Dec 2023 08:00  Patient On (Oxygen Delivery Method): room air    Gen: A&Ox3, NAD  Chest: CTABL   Cardiac: S1+S2+ RRR  Abdomen: Soft, nontender, +BS, no guarding, no rebound, gravid uterus  Extremities: Nontender, no worsening edema, DTRS 2+                            8.6    5.76  )-----------( 228      ( 31 Dec 2023 06:50 )             29.2     12-31    137  |  113<H>  |  3<L>  ----------------------------<  84  4.5   |  21<L>  |  0.37<L>    Ca    8.6      31 Dec 2023 08:58    TPro  6.0  /  Alb  2.6<L>  /  TBili  0.5  /  DBili  x   /  AST  14  /  ALT  12  /  AlkPhos  50  12-31    NST: Baseline 150, moderate variability +accels, no decels  [place]: No contractions

## 2023-12-31 NOTE — DISCHARGE NOTE ANTEPARTUM - CARE PROVIDER_API CALL
Cele Vyas  Obstetrics and Gynecology  [address], [address] [place] [address]  Phone: [phone]  Fax: [phone]  Follow Up Time: 1 week   Cele Vyas  Obstetrics and Gynecology  [address] [place] [address]  Phone: [phone]  Fax: [phone]  Follow Up Time: 1 week

## 2023-12-31 NOTE — DISCHARGE NOTE ANTEPARTUM - CARE PLAN
Principal Discharge DX:	Anemia in pregnancy  Assessment and plan of treatment:	Continue iron supplementation.  Follow up in office as scheduled.  Kick counts reviewed  Continue prenatal vitamins Follow up outpatient with hematology   1

## 2023-12-31 NOTE — DISCHARGE NOTE ANTEPARTUM - PATIENT PORTAL LINK FT
You can access the FollowMyHealth Patient Portal offered by [facility] by registering at the following website: [email or web address]. By joining [employer]’s FollowMyHealth portal, you will also be able to view your health information using other applications (apps) compatible with our system. You can access the FollowMyHealth Patient Portal offered by [facility] by registering at the following website: [email or web address]. By joining [employer]’s FollowMyHealth portal, you will also be able to view your health information using other applications (apps) compatible with our system.

## 2023-12-31 NOTE — DISCHARGE NOTE ANTEPARTUM - CARE PROVIDERS DIRECT ADDRESSES
[email or web address]

## 2023-12-31 NOTE — DISCHARGE NOTE ANTEPARTUM - HOSPITAL COURSE
Patient presents with referral from office for symptomatic chronic anemia.  Given 2 units PRBC with appropriate rise.  Discharged home in stable condition with outpatient follow up.

## 2023-12-31 NOTE — DISCHARGE NOTE ANTEPARTUM - PLAN OF CARE
Continue iron supplementation.  Follow up in office as scheduled.  Kick counts reviewed  Continue prenatal vitamins Follow up outpatient with hematology

## 2023-12-31 NOTE — DISCHARGE NOTE ANTEPARTUM - MEDICATION SUMMARY - MEDICATIONS TO TAKE
I will START or STAY ON the medications listed below when I get home from the hospital:    Prenatal 1 oral capsule  -- 1 caplet(s) by mouth once a day  -- Indication: For Pregnancy      ferrous sulfate 325 mg (65 mg elemental iron) oral tablet  -- 1 tab(s) by mouth 2 times a day  -- Indication: For Anemia in pregnancy

## 2023-12-31 NOTE — PROGRESS NOTE ADULT - ASSESSMENT
28 year old  at 25w6d admitted for symptomatic anemia.  Reassuring fetal status    -Discharge home with strict precautions  -Report to [address] [place] [address] to establish care  -Follow up with hematologist for outpatient venofer/care  -Oral iron supplements  -Increase oral and PO intake    Discussed with Dr Peacock  28 year old  at 25w6d admitted for symptomatic anemia.  Reassuring fetal status    -Discharge home with strict precautions  -Report to [address] [place] [address] to establish care  -Follow up with hematologist for outpatient venofer/care  -Oral iron supplements  -Increase oral and PO intake    Discussed with Dr Peacock

## 2023-12-31 NOTE — DISCHARGE NOTE ANTEPARTUM - PROVIDER TOKENS
PROVIDER:[TOKEN:[[id number]],FOLLOWUP:[1 week]] PROVIDER:[TOKEN:[[id number]],FOLLOWUP:[1 week]]

## 2023-12-31 NOTE — DISCHARGE NOTE ANTEPARTUM - NS MD DC FALL RISK RISK
For information on Fall & Injury Prevention, visit: [email or web address] OR  [email or web address] OR  https://www.cdc.gov/steadi/patient.html For information on Fall & Injury Prevention, visit: [email or web address]://[email or web address]-[email or web address]-mobility OR  [email or web address]://[email or web address]/[email or web address]-prevention-tips-to-[email or web address]-injury OR  https://www.cdc.gov/steadi/patient.html

## 2024-01-01 LAB
CULTURE RESULTS: SIGNIFICANT CHANGE UP
CULTURE RESULTS: SIGNIFICANT CHANGE UP
SPECIMEN SOURCE: SIGNIFICANT CHANGE UP
SPECIMEN SOURCE: SIGNIFICANT CHANGE UP

## 2024-01-02 PROBLEM — D64.9 ANEMIA, UNSPECIFIED: Chronic | Status: ACTIVE | Noted: 2023-12-30

## 2024-01-02 PROBLEM — O99.019 ANEMIA COMPLICATING PREGNANCY, UNSPECIFIED TRIMESTER: Chronic | Status: ACTIVE | Noted: 2023-12-30

## 2024-01-03 PROBLEM — Z00.00 ENCOUNTER FOR PREVENTIVE HEALTH EXAMINATION: Status: ACTIVE | Noted: 2024-01-03

## 2024-01-04 ENCOUNTER — OUTPATIENT (OUTPATIENT)
Dept: OUTPATIENT SERVICES | Facility: HOSPITAL | Age: 29
LOS: 1 days | End: 2024-01-04
Payer: MEDICAID

## 2024-01-04 ENCOUNTER — LABORATORY RESULT (OUTPATIENT)
Age: 29
End: 2024-01-04

## 2024-01-04 ENCOUNTER — APPOINTMENT (OUTPATIENT)
Age: 29
End: 2024-01-04
Payer: MEDICAID

## 2024-01-04 VITALS
HEIGHT: 61 IN | SYSTOLIC BLOOD PRESSURE: 107 MMHG | BODY MASS INDEX: 27.56 KG/M2 | HEART RATE: 83 BPM | WEIGHT: 146 LBS | OXYGEN SATURATION: 99 % | TEMPERATURE: 98.2 F | DIASTOLIC BLOOD PRESSURE: 69 MMHG

## 2024-01-04 DIAGNOSIS — O99.019 ANEMIA COMPLICATING PREGNANCY, UNSPECIFIED TRIMESTER: ICD-10-CM

## 2024-01-04 DIAGNOSIS — Z34.00 ENCOUNTER FOR SUPERVISION OF NORMAL FIRST PREGNANCY, UNSPECIFIED TRIMESTER: ICD-10-CM

## 2024-01-04 DIAGNOSIS — D64.9 ANEMIA, UNSPECIFIED: ICD-10-CM

## 2024-01-04 DIAGNOSIS — Z98.890 OTHER SPECIFIED POSTPROCEDURAL STATES: Chronic | ICD-10-CM

## 2024-01-04 DIAGNOSIS — Z78.9 OTHER SPECIFIED HEALTH STATUS: ICD-10-CM

## 2024-01-04 PROCEDURE — 83020 HEMOGLOBIN ELECTROPHORESIS: CPT

## 2024-01-04 PROCEDURE — 99203 OFFICE O/P NEW LOW 30 MIN: CPT | Mod: TH

## 2024-01-04 PROCEDURE — G0463: CPT

## 2024-01-04 PROCEDURE — 81003 URINALYSIS AUTO W/O SCOPE: CPT

## 2024-01-04 PROCEDURE — 85025 COMPLETE CBC W/AUTO DIFF WBC: CPT

## 2024-01-04 PROCEDURE — 86850 RBC ANTIBODY SCREEN: CPT

## 2024-01-04 PROCEDURE — 81243 FMR1 GEN ALY DETC ABNL ALLEL: CPT

## 2024-01-04 PROCEDURE — 81220 CFTR GENE COM VARIANTS: CPT

## 2024-01-04 PROCEDURE — 86901 BLOOD TYPING SEROLOGIC RH(D): CPT

## 2024-01-04 PROCEDURE — 81025 URINE PREGNANCY TEST: CPT

## 2024-01-04 PROCEDURE — 86900 BLOOD TYPING SEROLOGIC ABO: CPT

## 2024-01-04 PROCEDURE — 87086 URINE CULTURE/COLONY COUNT: CPT

## 2024-01-04 PROCEDURE — 86480 TB TEST CELL IMMUN MEASURE: CPT

## 2024-01-04 PROCEDURE — 81329 SMN1 GENE DOS/DELETION ALYS: CPT

## 2024-01-04 RX ORDER — PNV/FERROUS SULFATE/FOLIC ACID 27-<0.5MG
TABLET ORAL
Refills: 0 | Status: ACTIVE | COMMUNITY

## 2024-01-04 RX ORDER — PNV NO.95/FERROUS FUM/FOLIC AC 28MG-0.8MG
325 TABLET ORAL
Refills: 0 | Status: ACTIVE | COMMUNITY

## 2024-01-08 ENCOUNTER — ASOB RESULT (OUTPATIENT)
Age: 29
End: 2024-01-08

## 2024-01-08 ENCOUNTER — APPOINTMENT (OUTPATIENT)
Dept: ANTEPARTUM | Facility: CLINIC | Age: 29
End: 2024-01-08
Payer: MEDICAID

## 2024-01-08 DIAGNOSIS — O09.91 SUPERVISION OF HIGH RISK PREGNANCY, UNSPECIFIED, FIRST TRIMESTER: ICD-10-CM

## 2024-01-08 DIAGNOSIS — Z3A.26 26 WEEKS GESTATION OF PREGNANCY: ICD-10-CM

## 2024-01-08 DIAGNOSIS — O99.019 ANEMIA COMPLICATING PREGNANCY, UNSPECIFIED TRIMESTER: ICD-10-CM

## 2024-01-08 LAB
ABO + RH PNL BLD: NORMAL
BACTERIA UR CULT: NORMAL
BLD GP AB SCN SERPL QL: NORMAL
C TRACH RRNA SPEC QL NAA+PROBE: NOT DETECTED
HGB A MFR BLD: 97.7 %
HGB A2 MFR BLD: 2.3 %
HGB FRACT BLD-IMP: NORMAL
M TB IFN-G BLD-IMP: NEGATIVE
N GONORRHOEA RRNA SPEC QL NAA+PROBE: NOT DETECTED
QUANTIFERON TB PLUS MITOGEN MINUS NIL: 2.51 IU/ML
QUANTIFERON TB PLUS NIL: 0.01 IU/ML
QUANTIFERON TB PLUS TB1 MINUS NIL: 0 IU/ML
QUANTIFERON TB PLUS TB2 MINUS NIL: 0 IU/ML
SOURCE AMPLIFICATION: NORMAL

## 2024-01-08 PROCEDURE — 76805 OB US >/= 14 WKS SNGL FETUS: CPT

## 2024-01-11 LAB
AR GENE MUT ANL BLD/T: NORMAL
CFTR MUT TESTED BLD/T: NEGATIVE
FMR1 GENE MUT ANL BLD/T: NORMAL

## 2024-01-16 ENCOUNTER — APPOINTMENT (OUTPATIENT)
Dept: ANTEPARTUM | Facility: CLINIC | Age: 29
End: 2024-01-16
Payer: MEDICAID

## 2024-01-16 ENCOUNTER — ASOB RESULT (OUTPATIENT)
Age: 29
End: 2024-01-16

## 2024-01-16 PROCEDURE — 76816 OB US FOLLOW-UP PER FETUS: CPT

## 2024-01-18 ENCOUNTER — OUTPATIENT (OUTPATIENT)
Dept: OUTPATIENT SERVICES | Facility: HOSPITAL | Age: 29
LOS: 1 days | End: 2024-01-18
Payer: MEDICAID

## 2024-01-18 ENCOUNTER — LABORATORY RESULT (OUTPATIENT)
Age: 29
End: 2024-01-18

## 2024-01-18 ENCOUNTER — NON-APPOINTMENT (OUTPATIENT)
Age: 29
End: 2024-01-18

## 2024-01-18 ENCOUNTER — APPOINTMENT (OUTPATIENT)
Dept: OBGYN | Facility: CLINIC | Age: 29
End: 2024-01-18
Payer: MEDICAID

## 2024-01-18 VITALS
TEMPERATURE: 97.9 F | HEIGHT: 61 IN | BODY MASS INDEX: 27.75 KG/M2 | SYSTOLIC BLOOD PRESSURE: 114 MMHG | WEIGHT: 147 LBS | HEART RATE: 90 BPM | DIASTOLIC BLOOD PRESSURE: 73 MMHG | RESPIRATION RATE: 16 BRPM | OXYGEN SATURATION: 100 %

## 2024-01-18 DIAGNOSIS — Z34.00 ENCOUNTER FOR SUPERVISION OF NORMAL FIRST PREGNANCY, UNSPECIFIED TRIMESTER: ICD-10-CM

## 2024-01-18 DIAGNOSIS — Z98.890 OTHER SPECIFIED POSTPROCEDURAL STATES: Chronic | ICD-10-CM

## 2024-01-18 PROCEDURE — G0463: CPT

## 2024-01-18 PROCEDURE — 81257 HBA1/HBA2 GENE: CPT

## 2024-01-18 PROCEDURE — 99213 OFFICE O/P EST LOW 20 MIN: CPT | Mod: TH

## 2024-01-18 PROCEDURE — 81003 URINALYSIS AUTO W/O SCOPE: CPT

## 2024-01-19 DIAGNOSIS — Z34.93 ENCOUNTER FOR SUPERVISION OF NORMAL PREGNANCY, UNSPECIFIED, THIRD TRIMESTER: ICD-10-CM

## 2024-01-24 LAB
BASOPHILS # BLD AUTO: 0 K/UL
BASOPHILS NFR BLD AUTO: 0 %
EOSINOPHIL # BLD AUTO: 0.07 K/UL
EOSINOPHIL NFR BLD AUTO: 0.9 %
HCT VFR BLD CALC: 34.3 %
HGB BLD-MCNC: 9.8 G/DL
LYMPHOCYTES # BLD AUTO: 2.37 K/UL
LYMPHOCYTES NFR BLD AUTO: 29.7 %
MAN DIFF?: NORMAL
MCHC RBC-ENTMCNC: 20.1 PG
MCHC RBC-ENTMCNC: 28.6 GM/DL
MCV RBC AUTO: 70.3 FL
MONOCYTES # BLD AUTO: 0.47 K/UL
MONOCYTES NFR BLD AUTO: 5.9 %
NEUTROPHILS # BLD AUTO: 5 K/UL
NEUTROPHILS NFR BLD AUTO: 62.7 %
PLATELET # BLD AUTO: 260 K/UL
RBC # BLD: 4.88 M/UL
RBC # FLD: 23.8 %
WBC # FLD AUTO: 7.97 K/UL

## 2024-02-05 ENCOUNTER — APPOINTMENT (OUTPATIENT)
Dept: ANTEPARTUM | Facility: CLINIC | Age: 29
End: 2024-02-05

## 2024-02-05 ENCOUNTER — APPOINTMENT (OUTPATIENT)
Dept: ANTEPARTUM | Facility: CLINIC | Age: 29
End: 2024-02-05
Payer: MEDICAID

## 2024-02-05 ENCOUNTER — NON-APPOINTMENT (OUTPATIENT)
Age: 29
End: 2024-02-05

## 2024-02-05 ENCOUNTER — ASOB RESULT (OUTPATIENT)
Age: 29
End: 2024-02-05

## 2024-02-05 ENCOUNTER — OUTPATIENT (OUTPATIENT)
Dept: OUTPATIENT SERVICES | Facility: HOSPITAL | Age: 29
LOS: 1 days | End: 2024-02-05
Payer: MEDICAID

## 2024-02-05 ENCOUNTER — APPOINTMENT (OUTPATIENT)
Dept: OBGYN | Facility: CLINIC | Age: 29
End: 2024-02-05
Payer: MEDICAID

## 2024-02-05 ENCOUNTER — MED ADMIN CHARGE (OUTPATIENT)
Age: 29
End: 2024-02-05

## 2024-02-05 VITALS
OXYGEN SATURATION: 100 % | WEIGHT: 145.25 LBS | HEART RATE: 89 BPM | DIASTOLIC BLOOD PRESSURE: 64 MMHG | BODY MASS INDEX: 27.42 KG/M2 | HEIGHT: 61 IN | TEMPERATURE: 97.5 F | SYSTOLIC BLOOD PRESSURE: 99 MMHG | RESPIRATION RATE: 18 BRPM

## 2024-02-05 VITALS
DIASTOLIC BLOOD PRESSURE: 67 MMHG | BODY MASS INDEX: 27.75 KG/M2 | HEART RATE: 84 BPM | SYSTOLIC BLOOD PRESSURE: 104 MMHG | HEIGHT: 61 IN | WEIGHT: 147 LBS

## 2024-02-05 DIAGNOSIS — Z23 ENCOUNTER FOR IMMUNIZATION: ICD-10-CM

## 2024-02-05 DIAGNOSIS — Z98.890 OTHER SPECIFIED POSTPROCEDURAL STATES: Chronic | ICD-10-CM

## 2024-02-05 DIAGNOSIS — Z34.00 ENCOUNTER FOR SUPERVISION OF NORMAL FIRST PREGNANCY, UNSPECIFIED TRIMESTER: ICD-10-CM

## 2024-02-05 LAB
APTT BLD: 25.5 SEC
B2 GLYCOPROT1 IGG SER-ACNC: 11.5 SGU
B2 GLYCOPROT1 IGM SER-ACNC: <5 SMU
CARDIOLIPIN IGM SER-MCNC: <5 GPL
CARDIOLIPIN IGM SER-MCNC: <5 MPL
CMV IGG SERPL QL: <0.2 U/ML
CMV IGG SERPL-IMP: NEGATIVE
CMV IGM SERPL QL: <8 AU/ML
CMV IGM SERPL QL: NEGATIVE
CONFIRM: 26 SEC
DRVVT IMM 1:2 NP PPP: NORMAL
DRVVT SCREEN TO CONFIRM RATIO: 1.02 RATIO
SCREEN DRVVT: 31.2 SEC
SILICA CLOTTING TIME INTERPRETATION: NORMAL
SILICA CLOTTING TIME S/C: 0.96 RATIO
T GONDII AB SER-IMP: NEGATIVE
T GONDII AB SER-IMP: NEGATIVE
T GONDII IGG SER QL: <3 IU/ML
T GONDII IGM SER QL: <3 AU/ML

## 2024-02-05 PROCEDURE — 76816 OB US FOLLOW-UP PER FETUS: CPT

## 2024-02-05 PROCEDURE — 81003 URINALYSIS AUTO W/O SCOPE: CPT

## 2024-02-05 PROCEDURE — G0463: CPT

## 2024-02-05 PROCEDURE — 99213 OFFICE O/P EST LOW 20 MIN: CPT | Mod: TH

## 2024-02-05 PROCEDURE — 99205 OFFICE O/P NEW HI 60 MIN: CPT | Mod: TH

## 2024-02-05 PROCEDURE — 76819 FETAL BIOPHYS PROFIL W/O NST: CPT | Mod: 59

## 2024-02-06 DIAGNOSIS — Z34.93 ENCOUNTER FOR SUPERVISION OF NORMAL PREGNANCY, UNSPECIFIED, THIRD TRIMESTER: ICD-10-CM

## 2024-02-20 ENCOUNTER — APPOINTMENT (OUTPATIENT)
Dept: ANTEPARTUM | Facility: CLINIC | Age: 29
End: 2024-02-20

## 2024-02-26 ENCOUNTER — NON-APPOINTMENT (OUTPATIENT)
Age: 29
End: 2024-02-26

## 2024-02-26 ENCOUNTER — APPOINTMENT (OUTPATIENT)
Dept: OBGYN | Facility: CLINIC | Age: 29
End: 2024-02-26
Payer: MEDICAID

## 2024-02-26 ENCOUNTER — OUTPATIENT (OUTPATIENT)
Dept: OUTPATIENT SERVICES | Facility: HOSPITAL | Age: 29
LOS: 1 days | End: 2024-02-26
Payer: MEDICAID

## 2024-02-26 ENCOUNTER — APPOINTMENT (OUTPATIENT)
Dept: ANTEPARTUM | Facility: CLINIC | Age: 29
End: 2024-02-26

## 2024-02-26 VITALS
HEART RATE: 108 BPM | DIASTOLIC BLOOD PRESSURE: 71 MMHG | OXYGEN SATURATION: 99 % | BODY MASS INDEX: 28.51 KG/M2 | HEIGHT: 61 IN | RESPIRATION RATE: 16 BRPM | SYSTOLIC BLOOD PRESSURE: 121 MMHG | TEMPERATURE: 98.5 F | WEIGHT: 151 LBS

## 2024-02-26 DIAGNOSIS — Z34.00 ENCOUNTER FOR SUPERVISION OF NORMAL FIRST PREGNANCY, UNSPECIFIED TRIMESTER: ICD-10-CM

## 2024-02-26 DIAGNOSIS — Z98.890 OTHER SPECIFIED POSTPROCEDURAL STATES: Chronic | ICD-10-CM

## 2024-02-26 DIAGNOSIS — Z87.59 PERSONAL HISTORY OF OTHER COMPLICATIONS OF PREGNANCY, CHILDBIRTH AND THE PUERPERIUM: ICD-10-CM

## 2024-02-26 PROCEDURE — G0463: CPT

## 2024-02-26 PROCEDURE — 81003 URINALYSIS AUTO W/O SCOPE: CPT

## 2024-02-26 PROCEDURE — 99213 OFFICE O/P EST LOW 20 MIN: CPT | Mod: TH

## 2024-02-27 DIAGNOSIS — Z87.59 PERSONAL HISTORY OF OTHER COMPLICATIONS OF PREGNANCY, CHILDBIRTH AND THE PUERPERIUM: ICD-10-CM

## 2024-02-27 DIAGNOSIS — O09.91 SUPERVISION OF HIGH RISK PREGNANCY, UNSPECIFIED, FIRST TRIMESTER: ICD-10-CM

## 2024-02-28 ENCOUNTER — APPOINTMENT (OUTPATIENT)
Dept: ANTEPARTUM | Facility: CLINIC | Age: 29
End: 2024-02-28
Payer: MEDICAID

## 2024-02-28 ENCOUNTER — ASOB RESULT (OUTPATIENT)
Age: 29
End: 2024-02-28

## 2024-02-28 PROCEDURE — 76818 FETAL BIOPHYS PROFILE W/NST: CPT

## 2024-03-04 ENCOUNTER — ASOB RESULT (OUTPATIENT)
Age: 29
End: 2024-03-04

## 2024-03-04 ENCOUNTER — APPOINTMENT (OUTPATIENT)
Dept: ANTEPARTUM | Facility: CLINIC | Age: 29
End: 2024-03-04
Payer: MEDICAID

## 2024-03-04 PROCEDURE — 76818 FETAL BIOPHYS PROFILE W/NST: CPT | Mod: 59

## 2024-03-04 PROCEDURE — ZZZZZ: CPT

## 2024-03-04 PROCEDURE — 76816 OB US FOLLOW-UP PER FETUS: CPT

## 2024-03-11 ENCOUNTER — APPOINTMENT (OUTPATIENT)
Dept: ANTEPARTUM | Facility: CLINIC | Age: 29
End: 2024-03-11
Payer: MEDICAID

## 2024-03-11 ENCOUNTER — APPOINTMENT (OUTPATIENT)
Dept: OBGYN | Facility: CLINIC | Age: 29
End: 2024-03-11
Payer: MEDICAID

## 2024-03-11 ENCOUNTER — OUTPATIENT (OUTPATIENT)
Dept: OUTPATIENT SERVICES | Facility: HOSPITAL | Age: 29
LOS: 1 days | End: 2024-03-11
Payer: MEDICAID

## 2024-03-11 ENCOUNTER — ASOB RESULT (OUTPATIENT)
Age: 29
End: 2024-03-11

## 2024-03-11 ENCOUNTER — LABORATORY RESULT (OUTPATIENT)
Age: 29
End: 2024-03-11

## 2024-03-11 VITALS
HEART RATE: 100 BPM | TEMPERATURE: 97.7 F | HEIGHT: 61 IN | OXYGEN SATURATION: 100 % | BODY MASS INDEX: 28.7 KG/M2 | WEIGHT: 152 LBS | RESPIRATION RATE: 16 BRPM | DIASTOLIC BLOOD PRESSURE: 66 MMHG | SYSTOLIC BLOOD PRESSURE: 100 MMHG

## 2024-03-11 DIAGNOSIS — Z34.00 ENCOUNTER FOR SUPERVISION OF NORMAL FIRST PREGNANCY, UNSPECIFIED TRIMESTER: ICD-10-CM

## 2024-03-11 DIAGNOSIS — Z98.890 OTHER SPECIFIED POSTPROCEDURAL STATES: Chronic | ICD-10-CM

## 2024-03-11 DIAGNOSIS — O09.91 SUPERVISION OF HIGH RISK PREGNANCY, UNSPECIFIED, FIRST TRIMESTER: ICD-10-CM

## 2024-03-11 PROCEDURE — 87653 STREP B DNA AMP PROBE: CPT

## 2024-03-11 PROCEDURE — 99213 OFFICE O/P EST LOW 20 MIN: CPT | Mod: TH

## 2024-03-11 PROCEDURE — 76818 FETAL BIOPHYS PROFILE W/NST: CPT

## 2024-03-11 PROCEDURE — 84156 ASSAY OF PROTEIN URINE: CPT

## 2024-03-11 PROCEDURE — 87389 HIV-1 AG W/HIV-1&-2 AB AG IA: CPT

## 2024-03-11 PROCEDURE — 81003 URINALYSIS AUTO W/O SCOPE: CPT

## 2024-03-11 PROCEDURE — 85025 COMPLETE CBC W/AUTO DIFF WBC: CPT

## 2024-03-11 PROCEDURE — 86780 TREPONEMA PALLIDUM: CPT

## 2024-03-11 PROCEDURE — 80053 COMPREHEN METABOLIC PANEL: CPT

## 2024-03-11 PROCEDURE — G0463: CPT

## 2024-03-12 DIAGNOSIS — O09.91 SUPERVISION OF HIGH RISK PREGNANCY, UNSPECIFIED, FIRST TRIMESTER: ICD-10-CM

## 2024-03-12 DIAGNOSIS — Z3A.35 35 WEEKS GESTATION OF PREGNANCY: ICD-10-CM

## 2024-03-15 LAB
ALBUMIN SERPL ELPH-MCNC: 3.9 G/DL
ALP BLD-CCNC: 113 U/L
ALT SERPL-CCNC: 8 U/L
ANION GAP SERPL CALC-SCNC: 12 MMOL/L
AST SERPL-CCNC: 15 U/L
BASOPHILS # BLD AUTO: 0.02 K/UL
BASOPHILS NFR BLD AUTO: 0.3 %
BILIRUB SERPL-MCNC: 0.2 MG/DL
BUN SERPL-MCNC: 5 MG/DL
C TRACH RRNA SPEC QL NAA+PROBE: NOT DETECTED
CALCIUM SERPL-MCNC: 9.1 MG/DL
CHLORIDE SERPL-SCNC: 107 MMOL/L
CO2 SERPL-SCNC: 21 MMOL/L
CREAT SERPL-MCNC: 0.48 MG/DL
CREAT SPEC-SCNC: 186 MG/DL
CREAT/PROT UR: 0.1 RATIO
EGFR: 132 ML/MIN/1.73M2
EOSINOPHIL # BLD AUTO: 0.14 K/UL
EOSINOPHIL NFR BLD AUTO: 2.2 %
GLUCOSE SERPL-MCNC: 83 MG/DL
GP B STREP DNA SPEC QL NAA+PROBE: NOT DETECTED
HCT VFR BLD CALC: 37.9 %
HGB BLD-MCNC: 11.4 G/DL
HIV1+2 AB SPEC QL IA.RAPID: NONREACTIVE
IMM GRANULOCYTES NFR BLD AUTO: 0.5 %
LYMPHOCYTES # BLD AUTO: 1.94 K/UL
LYMPHOCYTES NFR BLD AUTO: 31.1 %
MAN DIFF?: NORMAL
MCHC RBC-ENTMCNC: 24.7 PG
MCHC RBC-ENTMCNC: 30.1 GM/DL
MCV RBC AUTO: 82.2 FL
MONOCYTES # BLD AUTO: 0.51 K/UL
MONOCYTES NFR BLD AUTO: 8.2 %
N GONORRHOEA RRNA SPEC QL NAA+PROBE: NOT DETECTED
NEUTROPHILS # BLD AUTO: 3.59 K/UL
NEUTROPHILS NFR BLD AUTO: 57.7 %
PLATELET # BLD AUTO: 222 K/UL
POTASSIUM SERPL-SCNC: 4.4 MMOL/L
PROT SERPL-MCNC: 6.3 G/DL
PROT UR-MCNC: 23 MG/DL
RBC # BLD: 4.61 M/UL
RBC # FLD: 28.3 %
SODIUM SERPL-SCNC: 140 MMOL/L
SOURCE AMPLIFICATION: NORMAL
SOURCE GBS: NORMAL
T PALLIDUM AB SER QL IA: NEGATIVE
WBC # FLD AUTO: 6.23 K/UL

## 2024-03-18 ENCOUNTER — ASOB RESULT (OUTPATIENT)
Age: 29
End: 2024-03-18

## 2024-03-18 ENCOUNTER — APPOINTMENT (OUTPATIENT)
Dept: ANTEPARTUM | Facility: CLINIC | Age: 29
End: 2024-03-18
Payer: MEDICAID

## 2024-03-18 ENCOUNTER — OUTPATIENT (OUTPATIENT)
Dept: OUTPATIENT SERVICES | Facility: HOSPITAL | Age: 29
LOS: 1 days | End: 2024-03-18
Payer: MEDICAID

## 2024-03-18 VITALS
OXYGEN SATURATION: 97 % | DIASTOLIC BLOOD PRESSURE: 68 MMHG | HEART RATE: 81 BPM | RESPIRATION RATE: 17 BRPM | TEMPERATURE: 98 F | SYSTOLIC BLOOD PRESSURE: 111 MMHG

## 2024-03-18 DIAGNOSIS — O26.899 OTHER SPECIFIED PREGNANCY RELATED CONDITIONS, UNSPECIFIED TRIMESTER: ICD-10-CM

## 2024-03-18 DIAGNOSIS — Z98.890 OTHER SPECIFIED POSTPROCEDURAL STATES: Chronic | ICD-10-CM

## 2024-03-18 PROCEDURE — 99212 OFFICE O/P EST SF 10 MIN: CPT

## 2024-03-18 PROCEDURE — 76818 FETAL BIOPHYS PROFILE W/NST: CPT

## 2024-03-18 PROCEDURE — G0463: CPT

## 2024-03-18 PROCEDURE — 59025 FETAL NON-STRESS TEST: CPT

## 2024-03-18 NOTE — OB PROVIDER TRIAGE NOTE - NSOBPROVIDERNOTE_OBGYN_ALL_OB_FT
29 yo  at 36w6d with PTC, ruled out for PTL and variable on NST for prolonged monitoring  - evaluate in triage  - fetal monitoring  - Attg: Dr. Mosher 29 yo  at 36w6d with PTC, ruled out for PTL and variable on NST for prolonged monitoring, BPP 8/8, DAYAMI 15cm  - evaluate in triage  - fetal monitoring  - Attg: Dr. Mosher 29 yo  at 36w6d with PTC, ruled out for PTL and variable on NST for prolonged monitoring, BPP , DAYAMI 15cm  - evaluate in triage  - fetal monitoring  - Attg: Dr. Mosher    Addendum: 213p  pt s/p >1hr of fetal monitoring with no decels, ruled out for PTL  will dc home now with PTL precautions  kick counts reviewed  pt to return Wed, 3/20 for NST/BPP and follow up in [facility] on Monday 3/25 as scheduled  FHT and plan reviewed with Dr. Mosher (attg in house and covering) prior to dc

## 2024-03-18 NOTE — OB RN TRIAGE NOTE - FALL HARM RISK - UNIVERSAL INTERVENTIONS
Bed in lowest position, wheels locked, appropriate side rails in place/Call bell, personal items and telephone in reach/Instruct patient to call for assistance before getting out of bed or chair/Non-slip footwear when patient is out of bed/[place] to call system/Physically safe environment - no spills, clutter or unnecessary equipment/Purposeful Proactive Rounding/Room/bathroom lighting operational, light cord in reach

## 2024-03-18 NOTE — OB RN TRIAGE NOTE - NS_TRIAGEADDITIONAL COMMENTS_OBGYN_ALL_OB_FT
Pt. left prior to signing Discharge Papers today. Pt. called via cell phone and asked to return to complete paperwork, patient declined. Discharge instructions given over phone. DAVID Murphy verbally gave patient instructions. Pt. to return on wed 3/20 to sign Discharge papers. DAVID Murphy made aware.

## 2024-03-18 NOTE — OB PROVIDER TRIAGE NOTE - NSHPPHYSICALEXAM_GEN_ALL_CORE
Vital Signs Last 24 Hrs  T(C): 36.8 (18 Mar 2024 13:11), Max: 36.8 (18 Mar 2024 13:11)  T(F): 98.2 (18 Mar 2024 13:11), Max: 98.2 (18 Mar 2024 13:11)  HR: 81 (18 Mar 2024 13:11) (81 - 81)  BP: 111/68 (18 Mar 2024 13:11) (111/68 - 111/68)  BP(mean): --  RR: 17 (18 Mar 2024 13:11) (17 - 17)  SpO2: 97% (18 Mar 2024 13:11) (97% - 97%)    Parameters below as of 18 Mar 2024 13:11  Patient On (Oxygen Delivery Method): room air    Gen: NAD  Abd: soft, NT, gravid  Ext: no calf tenderness    VE: 1/0/-3    FHT: 150 moderate variability with accels no decels  toco: no CTX

## 2024-03-18 NOTE — OB RN TRIAGE NOTE - NS_OBGYNHISTORY_OBGYN_ALL_OB_FT
2012 FT Girl 7lbs 11oz   2018 FT Boy 7lbs 13oz  Fetal Demise 2020 @ 28 weeks    2021 FT Boy 7llbs 15oz  TOP x2 with pills  &   Anemic in pregnancy Iron infusion x7 last one in  & blood transfusion  x4 units 2023  Cyst

## 2024-03-18 NOTE — OB PROVIDER TRIAGE NOTE - NS_EFFACEMANT_OBGYN_ALL_OB_NU
Time-based billing (NON-critical care) Time-based billing (NON-critical care) Time-based billing (NON-critical care) 0

## 2024-03-18 NOTE — OB PROVIDER TRIAGE NOTE - HISTORY OF PRESENT ILLNESS
29 yo  at 36w6d EDC 24 LMP 23 sent from [facility] for PTC and variable decel. Pt feels irregular CTX. No LOF/VB. +FM    PNC with [facility] c/b   1) anemia requiring 4u PRBC on  currently getting IV iron    POBhx:   IUFD at 28 wks s/p precipitous delivery   medical TOP   medical TOP   FT  7#, uncomplicated   FT  7#, uncomplicated   FT  7#, uncomplicated    PGYNHx:  no abnl PAP, no C/F, no STI    PMHx:  denies     PSHx:  denies    Meds: PNV    NKDA

## 2024-03-18 NOTE — OB PROVIDER TRIAGE NOTE - NS_OBGYNHISTORY_OBGYN_ALL_OB_FT
POBhx:  2020 IUFD at 28 wks s/p precipitous delivery   medical TOP   medical TOP   FT  7#, uncomplicated   FT  7#, uncomplicated   FT  7#, uncomplicated    PGYNHx:  no abnl PAP, no C/F, no STI

## 2024-03-20 ENCOUNTER — NON-APPOINTMENT (OUTPATIENT)
Age: 29
End: 2024-03-20

## 2024-03-20 ENCOUNTER — INPATIENT (INPATIENT)
Facility: HOSPITAL | Age: 29
LOS: 2 days | Discharge: HOME | End: 2024-03-23
Attending: OBSTETRICS & GYNECOLOGY | Admitting: OBSTETRICS & GYNECOLOGY
Payer: MEDICAID

## 2024-03-20 VITALS
DIASTOLIC BLOOD PRESSURE: 64 MMHG | OXYGEN SATURATION: 99 % | HEART RATE: 94 BPM | TEMPERATURE: 98 F | RESPIRATION RATE: 17 BRPM | SYSTOLIC BLOOD PRESSURE: 107 MMHG

## 2024-03-20 DIAGNOSIS — O47.03 FALSE LABOR BEFORE 37 COMPLETED WEEKS OF GESTATION, THIRD TRIMESTER: ICD-10-CM

## 2024-03-20 DIAGNOSIS — D64.9 ANEMIA, UNSPECIFIED: ICD-10-CM

## 2024-03-20 DIAGNOSIS — O36.8330 MATERNAL CARE FOR ABNORMALITIES OF THE FETAL HEART RATE OR RHYTHM, THIRD TRIMESTER, NOT APPLICABLE OR UNSPECIFIED: ICD-10-CM

## 2024-03-20 DIAGNOSIS — Z3A.36 36 WEEKS GESTATION OF PREGNANCY: ICD-10-CM

## 2024-03-20 DIAGNOSIS — O26.899 OTHER SPECIFIED PREGNANCY RELATED CONDITIONS, UNSPECIFIED TRIMESTER: ICD-10-CM

## 2024-03-20 DIAGNOSIS — O99.013 ANEMIA COMPLICATING PREGNANCY, THIRD TRIMESTER: ICD-10-CM

## 2024-03-20 DIAGNOSIS — O09.293 SUPERVISION OF PREGNANCY WITH OTHER POOR REPRODUCTIVE OR OBSTETRIC HISTORY, THIRD TRIMESTER: ICD-10-CM

## 2024-03-20 DIAGNOSIS — Z34.90 ENCOUNTER FOR SUPERVISION OF NORMAL PREGNANCY, UNSPECIFIED, UNSPECIFIED TRIMESTER: ICD-10-CM

## 2024-03-20 DIAGNOSIS — Z34.80 ENCOUNTER FOR SUPERVISION OF OTHER NORMAL PREGNANCY, UNSPECIFIED TRIMESTER: ICD-10-CM

## 2024-03-20 LAB
ANISOCYTOSIS BLD QL: SLIGHT — SIGNIFICANT CHANGE UP
APTT BLD: 31.5 SEC — SIGNIFICANT CHANGE UP (ref 24.5–35.6)
BASOPHILS # BLD AUTO: 0.02 K/UL — SIGNIFICANT CHANGE UP (ref 0–0.2)
BASOPHILS NFR BLD AUTO: 0.3 % — SIGNIFICANT CHANGE UP (ref 0–2)
BLD GP AB SCN SERPL QL: SIGNIFICANT CHANGE UP
EOSINOPHIL # BLD AUTO: 0.06 K/UL — SIGNIFICANT CHANGE UP (ref 0–0.5)
EOSINOPHIL NFR BLD AUTO: 1 % — SIGNIFICANT CHANGE UP (ref 0–6)
FIBRINOGEN PPP-MCNC: 309 MG/DL — SIGNIFICANT CHANGE UP (ref 200–475)
HBV SURFACE AG SERPL QL IA: SIGNIFICANT CHANGE UP
HCT VFR BLD CALC: 39 % — SIGNIFICANT CHANGE UP (ref 34.5–45)
HGB BLD-MCNC: 12 G/DL — SIGNIFICANT CHANGE UP (ref 11.5–15.5)
IMM GRANULOCYTES NFR BLD AUTO: 0.5 % — SIGNIFICANT CHANGE UP (ref 0–0.9)
INR BLD: 0.89 RATIO — SIGNIFICANT CHANGE UP (ref 0.85–1.18)
LG PLATELETS BLD QL AUTO: SLIGHT — SIGNIFICANT CHANGE UP
LYMPHOCYTES # BLD AUTO: 2.07 K/UL — SIGNIFICANT CHANGE UP (ref 1–3.3)
LYMPHOCYTES # BLD AUTO: 33 % — SIGNIFICANT CHANGE UP (ref 13–44)
MANUAL SMEAR VERIFICATION: SIGNIFICANT CHANGE UP
MCHC RBC-ENTMCNC: 26.1 PG — LOW (ref 27–34)
MCHC RBC-ENTMCNC: 30.8 GM/DL — LOW (ref 32–36)
MCV RBC AUTO: 85 FL — SIGNIFICANT CHANGE UP (ref 80–100)
MICROCYTES BLD QL: SLIGHT — SIGNIFICANT CHANGE UP
MONOCYTES # BLD AUTO: 0.65 K/UL — SIGNIFICANT CHANGE UP (ref 0–0.9)
MONOCYTES NFR BLD AUTO: 10.4 % — SIGNIFICANT CHANGE UP (ref 2–14)
NEUTROPHILS # BLD AUTO: 3.45 K/UL — SIGNIFICANT CHANGE UP (ref 1.8–7.4)
NEUTROPHILS NFR BLD AUTO: 54.8 % — SIGNIFICANT CHANGE UP (ref 43–77)
NRBC # BLD: 0 /100 WBCS — SIGNIFICANT CHANGE UP (ref 0–0)
PLAT MORPH BLD: NORMAL — SIGNIFICANT CHANGE UP
PLATELET # BLD AUTO: 211 K/UL — SIGNIFICANT CHANGE UP (ref 150–400)
POLYCHROMASIA BLD QL SMEAR: SLIGHT — SIGNIFICANT CHANGE UP
PROTHROM AB SERPL-ACNC: 10.2 SEC — SIGNIFICANT CHANGE UP (ref 9.5–13)
RBC # BLD: 4.59 M/UL — SIGNIFICANT CHANGE UP (ref 3.8–5.2)
RBC # FLD: 26.5 % — HIGH (ref 10.3–14.5)
RBC BLD AUTO: ABNORMAL
WBC # BLD: 6.28 K/UL — SIGNIFICANT CHANGE UP (ref 3.8–10.5)
WBC # FLD AUTO: 6.28 K/UL — SIGNIFICANT CHANGE UP (ref 3.8–10.5)

## 2024-03-20 PROCEDURE — 76819 FETAL BIOPHYS PROFIL W/O NST: CPT | Mod: 26

## 2024-03-20 RX ORDER — OXYTOCIN 10 UNIT/ML
333.33 VIAL (ML) INJECTION
Qty: 20 | Refills: 0 | Status: COMPLETED | OUTPATIENT
Start: 2024-03-20

## 2024-03-20 RX ORDER — CITRIC ACID/SODIUM CITRATE 300-500 MG
15 SOLUTION, ORAL ORAL EVERY 6 HOURS
Refills: 0 | Status: DISCONTINUED | OUTPATIENT
Start: 2024-03-20 | End: 2024-03-21

## 2024-03-20 RX ORDER — SODIUM CHLORIDE 9 MG/ML
1000 INJECTION, SOLUTION INTRAVENOUS
Refills: 0 | Status: DISCONTINUED | OUTPATIENT
Start: 2024-03-20 | End: 2024-03-21

## 2024-03-20 RX ORDER — SODIUM CHLORIDE 9 MG/ML
1000 INJECTION, SOLUTION INTRAVENOUS ONCE
Refills: 0 | Status: COMPLETED | OUTPATIENT
Start: 2024-03-20 | End: 2024-03-20

## 2024-03-20 RX ORDER — SODIUM CHLORIDE 9 MG/ML
1000 INJECTION INTRAMUSCULAR; INTRAVENOUS; SUBCUTANEOUS ONCE
Refills: 0 | Status: DISCONTINUED | OUTPATIENT
Start: 2024-03-20 | End: 2024-03-23

## 2024-03-20 RX ORDER — CHLORHEXIDINE GLUCONATE 213 G/1000ML
1 SOLUTION TOPICAL DAILY
Refills: 0 | Status: DISCONTINUED | OUTPATIENT
Start: 2024-03-20 | End: 2024-03-21

## 2024-03-20 NOTE — OB RN PATIENT PROFILE - FALL HARM RISK - UNIVERSAL INTERVENTIONS
Bed in lowest position, wheels locked, appropriate side rails in place/Call bell, personal items and telephone in reach/Instruct patient to call for assistance before getting out of bed or chair/Non-slip footwear when patient is out of bed/[place] to call system/Physically safe environment - no spills, clutter or unnecessary equipment/Purposeful Proactive Rounding/Room/bathroom lighting operational, light cord in reach

## 2024-03-20 NOTE — OB RN TRIAGE NOTE - PAIN SCALE PREFERRED, PROFILE
numerical 0-10 Hydroxyzine Counseling: Patient advised that the medication is sedating and not to drive a car after taking this medication.  Patient informed of potential adverse effects including but not limited to dry mouth, urinary retention, and blurry vision.  The patient verbalized understanding of the proper use and possible adverse effects of hydroxyzine.  All of the patient's questions and concerns were addressed.

## 2024-03-20 NOTE — OB RN TRIAGE NOTE - NS_OBGYNHISTORY_OBGYN_ALL_OB_FT
girl FT 2012    2018 boy FT  2020 Stillbirth 26wks IUFD. received 4 PRBC's    2021 FT boy    TOP with pills 8wks    TOP with pills 8 wks     Anemia received iron infusion weekly since the beginning of February. Received 1 blood transfusion in this pregnancy

## 2024-03-20 NOTE — OB RN PATIENT PROFILE - BREAST MILK IS MORE DIGESTIBLE, MAKING VOMITING, DIARRHEA, GAS AND CONSTIPATION LESS COMMON
SPIRITUAL CARE DEPARTMENT - [facility]     Stroke/STEMI/Arrest Alert Note    PATIENT NAME: Kb Wilson    Room # 337/337-01   Name: Kb Wilson            Age: 77 y.o.  Gender: male          Jew: Other    Place of Mandaen:      Alert type:  Code Blue     Admit Date & Time: 2/27/2024  5:01 PM    ADVANCE DIRECTIVES IN CHART?  No    NAME OF DECISION MAKER: Antwon    RELATIONSHIP OF DECISION MAKER TO PATIENT: son     PATIENT/EVENT DESCRIPTION:  Kb Wilson is a 77 y.o. male.  Pt to be admitted to [facility]/337-01.         SPIRITUAL ASSESSMENT/INTERVENTION:  Writer responded to code blue overhead alert. Patient was unavailable and being attended to by mult-disciplinary team. Writer provided a ministry of presence and support to medical team. Writer was asked by Dr. Cast to meet patient's son in lobby and escort him to patient's room. As it was nearing shift change, this writer met pt's son, Antwon, in [facility] and escorted him to floor 3 and introduced patient's son to  Dee, who then escorted patient's son to room 337. Pt son appeared to be anxious, yet calm and coping.      PATIENT BELONGINGS:  This writer did not handle patient's belongings     ANY BELONGINGS OF SIGNIFICANT VALUE NOTED:  N/a     REGISTRATION STAFF NOTIFIED?  No      WHAT IS YOUR SPIRITUAL CARE PLAN FOR THIS PATIENT?:   Chaplains will continue to remain available to support patient and family as needed     Electronically signed by Chaplain Negin, on 2/29/2024 at 2:37 PM.  Spiritual Care Department  [facility] -  [phone]     02/29/24 1435   Encounter Summary   Encounter Overview/Reason  Crisis   Service Provided For: Family   Referral/Consult From: Physician;Nursing Supervisor/Manager   Support System Children   Last Encounter  02/29/24   Complexity of Encounter High   Begin Time 1400   End Time  1435   Total Time Calculated 35 min   Crisis   Type Code Blue   Assessment/Intervention/Outcome   Assessment Anxious;Calm  Statement Selected

## 2024-03-20 NOTE — OB PROVIDER H&P - ASSESSMENT
29yo  ega: 37 4/7weeks    here for admitted for NRFHT     cerivx: 1cm    gbbs sent     will discuss the mode of induction w incoming team  27yo  ega: 37 4/7weeks    here for admitted for NRFHT     cerivx: 1cm    gbbs negative    will discuss the mode of induction w incoming team

## 2024-03-20 NOTE — OB PROVIDER TRIAGE NOTE - NSOBPROVIDERNOTE_OBGYN_ALL_OB_FT
This is a 27 y/o  for NST and BPP  - CAT 1   - continue monitoring fetal/maternal   -NST reviewed   - seen with DAVID Arreguin   -JORGE/w Dr king

## 2024-03-20 NOTE — OB PROVIDER H&P - HISTORY OF PRESENT ILLNESS
27 y/o  at 37w2 days weeks EDC: 24 LMP:23 admitted for NRFHT w hx of IUFD  . Patient was seen  Monday for continuos monitoring due to anemia and hx of IUFD and was told to come back today for a repeat NST and BPP. Patient currently requiring weekly iron infusion since february in addition to 1 Unit of blood in december. +FM, with 2 contractions per hour. Denies LOF, VB, SOB, chest pain.   PNC : [facility]  OB :    top x2 , medical induced at 8 weeks    x4  1: 2012 delivered at 41 weeks without complications. Delivery was done at [place]   2: 2018 delivered at 37w2d without complications, delivered at [place]   3: 2021 delivered at 37 weeks in active labor without complications   4: 2020 IUFD at 26 weeks , delivered in route in ambulance requiring 4 units of PRBC - denies any reaction to transfusion - unknown cause for still birth   GYN : PAP : denies cysts, fibroids or any STDS   PMhx: Anemia   PSHx: none  PFHx: none  NKA   social : denies any smoking, alcohol, illicit drugs during pregnancy   Meds : PNV, weekly Iron infusion   Psych : denies

## 2024-03-20 NOTE — OB PROVIDER H&P - NSHPPHYSICALEXAM_GEN_ALL_CORE
Vital Signs Last 24 Hrs  T(C): 36.6 (20 Mar 2024 18:24), Max: 36.9 (20 Mar 2024 14:30)  T(F): 97.9 (20 Mar 2024 18:24), Max: 98.4 (20 Mar 2024 14:30)  HR: 75 (20 Mar 2024 18:24) (75 - 94)  BP: 102/67 (20 Mar 2024 18:24) (102/67 - 107/64)  RR: 17 (20 Mar 2024 18:24) (17 - 17)  SpO2: 99% (20 Mar 2024 18:24) (99% - 99%)    Parameters below as of 20 Mar 2024 18:24  Patient On (Oxygen Delivery Method): room air    NST occ variable decel noted on the monitor during triage and dr king offered iol    toco q occ    efw 3100 from last sono on 3/4     abd gravid soft NT no guarding no rebound    ve: 1cm/long/posterior     extrem no edema b/l

## 2024-03-20 NOTE — OB PROVIDER LABOR PROGRESS NOTE - ASSESSMENT
27 yo  at 37w2d admitted for miol due to NRFHT complicated by anemia pregnancy     - continuous maternal and fetal monitoring   - pain management per patient  - NST reviewed   - Vaginal cytotoec protocol   - 2u prbc on hold    D/W Dr Scales

## 2024-03-20 NOTE — OB PROVIDER TRIAGE NOTE - HISTORY OF PRESENT ILLNESS
29 y/o  at 37w2 days weeks EDC: 24 LMP:23  presenting for a follow up. Patient was seen  Monday for continuos monitoring due to anemia and hx of IUFD and was told to come back today for a repeat NST and BPP. Patient currently requiring weekly iron infusion since february in addition to 1 Unit of blood in december. +FM, with 2 contractions per hour. Denies LOF, VB, SOB, chest pain.   PNC : [facility]  OB :    top x2 , medical induced at 8 weeks    x4  1: 2012 delivered at 41 weeks without complications. Delivery was done at [place]   2: 2018 delivered at 37w2d without complications, delivered at [place]   3: 2021 delivered at 37 weeks in active labor without complications   4: 2020 IUFD at 26 weeks , delivered in route in ambulance requiring 4 units of PRBC - denies any reaction to transfusion - unknown cause for still birth   GYN : PAP : denies cysts, fibroids or any STDS   PMhx: Anemia   PSHx: none  PFHx: none  NKA   social : denies any smoking, alcohol, illicit drugs during pregnancy   Meds : PNV, weekly Iron infusion   Psych : denies

## 2024-03-21 LAB
RUBV IGG SER-ACNC: 0.9 INDEX — SIGNIFICANT CHANGE UP
RUBV IGG SER-IMP: SIGNIFICANT CHANGE UP
T PALLIDUM AB TITR SER: NEGATIVE — SIGNIFICANT CHANGE UP

## 2024-03-21 PROCEDURE — 59409 OBSTETRICAL CARE: CPT | Mod: U7,GC

## 2024-03-21 RX ORDER — DIPHENHYDRAMINE HCL 50 MG
25 CAPSULE ORAL EVERY 6 HOURS
Refills: 0 | Status: DISCONTINUED | OUTPATIENT
Start: 2024-03-21 | End: 2024-03-23

## 2024-03-21 RX ORDER — HYDROCORTISONE 1 %
1 OINTMENT (GRAM) TOPICAL EVERY 6 HOURS
Refills: 0 | Status: DISCONTINUED | OUTPATIENT
Start: 2024-03-21 | End: 2024-03-23

## 2024-03-21 RX ORDER — TETANUS TOXOID, REDUCED DIPHTHERIA TOXOID AND ACELLULAR PERTUSSIS VACCINE, ADSORBED 5; 2.5; 8; 8; 2.5 [IU]/.5ML; [IU]/.5ML; UG/.5ML; UG/.5ML; UG/.5ML
0.5 SUSPENSION INTRAMUSCULAR ONCE
Refills: 0 | Status: DISCONTINUED | OUTPATIENT
Start: 2024-03-21 | End: 2024-03-23

## 2024-03-21 RX ORDER — SIMETHICONE 80 MG/1
80 TABLET, CHEWABLE ORAL EVERY 4 HOURS
Refills: 0 | Status: DISCONTINUED | OUTPATIENT
Start: 2024-03-21 | End: 2024-03-23

## 2024-03-21 RX ORDER — IBUPROFEN 200 MG
600 TABLET ORAL EVERY 6 HOURS
Refills: 0 | Status: DISCONTINUED | OUTPATIENT
Start: 2024-03-21 | End: 2024-03-23

## 2024-03-21 RX ORDER — SODIUM CHLORIDE 9 MG/ML
3 INJECTION INTRAMUSCULAR; INTRAVENOUS; SUBCUTANEOUS EVERY 8 HOURS
Refills: 0 | Status: DISCONTINUED | OUTPATIENT
Start: 2024-03-21 | End: 2024-03-23

## 2024-03-21 RX ORDER — ACETAMINOPHEN 500 MG
975 TABLET ORAL
Refills: 0 | Status: DISCONTINUED | OUTPATIENT
Start: 2024-03-21 | End: 2024-03-23

## 2024-03-21 RX ORDER — LANOLIN
1 OINTMENT (GRAM) TOPICAL EVERY 6 HOURS
Refills: 0 | Status: DISCONTINUED | OUTPATIENT
Start: 2024-03-21 | End: 2024-03-23

## 2024-03-21 RX ORDER — PRAMOXINE HYDROCHLORIDE 150 MG/15G
1 AEROSOL, FOAM RECTAL EVERY 4 HOURS
Refills: 0 | Status: DISCONTINUED | OUTPATIENT
Start: 2024-03-21 | End: 2024-03-23

## 2024-03-21 RX ORDER — MAGNESIUM HYDROXIDE 400 MG/1
30 TABLET, CHEWABLE ORAL
Refills: 0 | Status: DISCONTINUED | OUTPATIENT
Start: 2024-03-21 | End: 2024-03-23

## 2024-03-21 RX ORDER — OXYCODONE HYDROCHLORIDE 5 MG/1
5 TABLET ORAL
Refills: 0 | Status: DISCONTINUED | OUTPATIENT
Start: 2024-03-21 | End: 2024-03-23

## 2024-03-21 RX ORDER — OXYTOCIN 10 UNIT/ML
333.33 VIAL (ML) INJECTION
Qty: 20 | Refills: 0 | Status: DISCONTINUED | OUTPATIENT
Start: 2024-03-21 | End: 2024-03-21

## 2024-03-21 RX ORDER — KETOROLAC TROMETHAMINE 30 MG/ML
30 SYRINGE (ML) INJECTION ONCE
Refills: 0 | Status: DISCONTINUED | OUTPATIENT
Start: 2024-03-21 | End: 2024-03-21

## 2024-03-21 RX ORDER — IBUPROFEN 200 MG
600 TABLET ORAL EVERY 6 HOURS
Refills: 0 | Status: COMPLETED | OUTPATIENT
Start: 2024-03-21 | End: 2025-02-17

## 2024-03-21 RX ORDER — LABETALOL HCL 100 MG
40 TABLET ORAL ONCE
Refills: 0 | Status: COMPLETED | OUTPATIENT
Start: 2024-03-21 | End: 2024-03-21

## 2024-03-21 RX ORDER — BENZOCAINE 10 %
1 GEL (GRAM) MUCOUS MEMBRANE EVERY 6 HOURS
Refills: 0 | Status: DISCONTINUED | OUTPATIENT
Start: 2024-03-21 | End: 2024-03-23

## 2024-03-21 RX ORDER — OXYTOCIN 10 UNIT/ML
20 VIAL (ML) INJECTION ONCE
Refills: 0 | Status: COMPLETED | OUTPATIENT
Start: 2024-03-21 | End: 2024-03-21

## 2024-03-21 RX ORDER — AER TRAVELER 0.5 G/1
1 SOLUTION RECTAL; TOPICAL EVERY 4 HOURS
Refills: 0 | Status: DISCONTINUED | OUTPATIENT
Start: 2024-03-21 | End: 2024-03-23

## 2024-03-21 RX ORDER — DIBUCAINE 1 %
1 OINTMENT (GRAM) RECTAL EVERY 6 HOURS
Refills: 0 | Status: DISCONTINUED | OUTPATIENT
Start: 2024-03-21 | End: 2024-03-23

## 2024-03-21 RX ADMIN — Medication 20 UNIT(S): at 09:32

## 2024-03-21 RX ADMIN — Medication 30 MILLIGRAM(S): at 17:16

## 2024-03-21 RX ADMIN — Medication 1000 MILLIUNIT(S)/MIN: at 11:22

## 2024-03-21 RX ADMIN — SODIUM CHLORIDE 3 MILLILITER(S): 9 INJECTION INTRAMUSCULAR; INTRAVENOUS; SUBCUTANEOUS at 22:10

## 2024-03-21 RX ADMIN — Medication 975 MILLIGRAM(S): at 16:34

## 2024-03-21 RX ADMIN — Medication 975 MILLIGRAM(S): at 22:12

## 2024-03-21 RX ADMIN — Medication 975 MILLIGRAM(S): at 21:12

## 2024-03-21 RX ADMIN — SODIUM CHLORIDE 3 MILLILITER(S): 9 INJECTION INTRAMUSCULAR; INTRAVENOUS; SUBCUTANEOUS at 14:00

## 2024-03-21 RX ADMIN — Medication 975 MILLIGRAM(S): at 15:34

## 2024-03-21 RX ADMIN — Medication 1 SPRAY(S): at 16:16

## 2024-03-21 RX ADMIN — Medication 30 MILLIGRAM(S): at 16:16

## 2024-03-21 RX ADMIN — Medication 1 TABLET(S): at 16:14

## 2024-03-21 RX ADMIN — SODIUM CHLORIDE 1000 MILLILITER(S): 9 INJECTION, SOLUTION INTRAVENOUS at 04:00

## 2024-03-21 NOTE — OB PROVIDER LABOR PROGRESS NOTE - ASSESSMENT
a/p siup @ 37w2d, miol for non reassuring fetal heart tracing,  currently stable  - continue close maternal/fetal monitoring  - for possible arom and pitocin augmentation  Patient seen at bedside with annabel Villagomez attending

## 2024-03-21 NOTE — OB PROVIDER DELIVERY SUMMARY - NSVAGDELIVERYA_OBGYN_ALL_OB
Received call from patient requesting to speak with surgery scheduler in regards to rescheduling her 9/23 surgery to 9/30. Unable to reach  at time of call. Patient can be reached at [phone] to further discuss. Message routed to  for assistance    Spontaneous

## 2024-03-21 NOTE — OB PROVIDER DELIVERY SUMMARY - NSPROVIDERDELIVERYNOTE_OBGYN_ALL_OB_FT
Baby girl born RAMBO via . No nuchal cord present. Bright red blood noted with birth of body. Shoulders and body birthed spontaneously and baby placed on mother's abdomen. Delayed cord clamping performed. Cord clamped x2 and cut by FOB. Placenta birthed spontaneously and intact. Fundal massage performed and fundus firm. Pitocin 40mu administered for PPH prophylaxis. Mother and baby stable.

## 2024-03-21 NOTE — OB PROVIDER LABOR PROGRESS NOTE - ASSESSMENT
27 yo  at 37w2d admitted for miol due to NRFHT complicated by anemia pregnancy     - continuous maternal and fetal monitoring   - pain management per patient, epidural in place   - NST reviewed   - Vaginal cytotoec protocol   - 2u prbc on hold    D/W Dr Scales 27 yo  at 37w2d admitted for miol due to NRFHT complicated by anemia pregnancy     - continuous maternal and fetal monitoring   - pain management per patient, epidural in place   - NST reviewed   - Vaginal cytotoec protocol   - 2u prbc on hold  - vaginal cytotec placed, pt tolerated well     D/W Dr Scales

## 2024-03-21 NOTE — OB PROVIDER LABOR PROGRESS NOTE - NS_SUBJECTIVE/OBJECTIVE_OBGYN_ALL_OB_FT
pt without complaints
Late entry not e, pt was seen at 9:00pm   Pt is resting comfortably in bed, no complaints at this time
pt is resting comfortably in bed, no complaints at this time. Epidural in place
pt feels lots of pain  was epidural
patient seen at bedside  pain well controlled  epidural in place  bp 120/69 hr 84

## 2024-03-21 NOTE — OB PROVIDER LABOR PROGRESS NOTE - NS_OBIHIFHRDETAILS_OBGYN_ALL_OB_FT
baseline 130, moderate variability, + accels, no decels
baseline 140, moderate variability, + accels, no decles
cat I
cat I
nst baseline 140 moderate varibililty + accels

## 2024-03-21 NOTE — CHART NOTE - NSCHARTNOTEFT_GEN_A_CORE
prolong nst as per dr king    bpp 8/8 shayla: 11 cephalic ant placenta
bp severe range 180/86  iv push 20  iv push 40   Dr. Scales at bedside  titikasie

## 2024-03-22 ENCOUNTER — TRANSCRIPTION ENCOUNTER (OUTPATIENT)
Age: 29
End: 2024-03-22

## 2024-03-22 DIAGNOSIS — D64.9 ANEMIA, UNSPECIFIED: ICD-10-CM

## 2024-03-22 LAB
BASOPHILS # BLD AUTO: 0.02 K/UL — SIGNIFICANT CHANGE UP (ref 0–0.2)
BASOPHILS NFR BLD AUTO: 0.2 % — SIGNIFICANT CHANGE UP (ref 0–2)
EOSINOPHIL # BLD AUTO: 0.12 K/UL — SIGNIFICANT CHANGE UP (ref 0–0.5)
EOSINOPHIL NFR BLD AUTO: 1.4 % — SIGNIFICANT CHANGE UP (ref 0–6)
HCT VFR BLD CALC: 33.7 % — LOW (ref 34.5–45)
HGB BLD-MCNC: 10.4 G/DL — LOW (ref 11.5–15.5)
IMM GRANULOCYTES NFR BLD AUTO: 0.3 % — SIGNIFICANT CHANGE UP (ref 0–0.9)
LYMPHOCYTES # BLD AUTO: 2.26 K/UL — SIGNIFICANT CHANGE UP (ref 1–3.3)
LYMPHOCYTES # BLD AUTO: 26.1 % — SIGNIFICANT CHANGE UP (ref 13–44)
MCHC RBC-ENTMCNC: 26.3 PG — LOW (ref 27–34)
MCHC RBC-ENTMCNC: 30.9 GM/DL — LOW (ref 32–36)
MCV RBC AUTO: 85.1 FL — SIGNIFICANT CHANGE UP (ref 80–100)
MONOCYTES # BLD AUTO: 0.88 K/UL — SIGNIFICANT CHANGE UP (ref 0–0.9)
MONOCYTES NFR BLD AUTO: 10.2 % — SIGNIFICANT CHANGE UP (ref 2–14)
NEUTROPHILS # BLD AUTO: 5.35 K/UL — SIGNIFICANT CHANGE UP (ref 1.8–7.4)
NEUTROPHILS NFR BLD AUTO: 61.8 % — SIGNIFICANT CHANGE UP (ref 43–77)
NRBC # BLD: 0 /100 WBCS — SIGNIFICANT CHANGE UP (ref 0–0)
PLATELET # BLD AUTO: 209 K/UL — SIGNIFICANT CHANGE UP (ref 150–400)
RBC # BLD: 3.96 M/UL — SIGNIFICANT CHANGE UP (ref 3.8–5.2)
RBC # FLD: 26 % — HIGH (ref 10.3–14.5)
WBC # BLD: 8.66 K/UL — SIGNIFICANT CHANGE UP (ref 3.8–10.5)
WBC # FLD AUTO: 8.66 K/UL — SIGNIFICANT CHANGE UP (ref 3.8–10.5)

## 2024-03-22 RX ORDER — KETOROLAC TROMETHAMINE 30 MG/ML
30 SYRINGE (ML) INJECTION EVERY 6 HOURS
Refills: 0 | Status: DISCONTINUED | OUTPATIENT
Start: 2024-03-22 | End: 2024-03-23

## 2024-03-22 RX ADMIN — SODIUM CHLORIDE 3 MILLILITER(S): 9 INJECTION INTRAMUSCULAR; INTRAVENOUS; SUBCUTANEOUS at 22:55

## 2024-03-22 RX ADMIN — SODIUM CHLORIDE 3 MILLILITER(S): 9 INJECTION INTRAMUSCULAR; INTRAVENOUS; SUBCUTANEOUS at 14:18

## 2024-03-22 RX ADMIN — Medication 600 MILLIGRAM(S): at 00:00

## 2024-03-22 RX ADMIN — Medication 600 MILLIGRAM(S): at 17:59

## 2024-03-22 RX ADMIN — Medication 30 MILLIGRAM(S): at 12:10

## 2024-03-22 RX ADMIN — Medication 600 MILLIGRAM(S): at 19:00

## 2024-03-22 RX ADMIN — Medication 600 MILLIGRAM(S): at 01:00

## 2024-03-22 RX ADMIN — Medication 600 MILLIGRAM(S): at 06:00

## 2024-03-22 RX ADMIN — Medication 1 TABLET(S): at 13:18

## 2024-03-22 RX ADMIN — Medication 600 MILLIGRAM(S): at 07:00

## 2024-03-22 RX ADMIN — SODIUM CHLORIDE 3 MILLILITER(S): 9 INJECTION INTRAMUSCULAR; INTRAVENOUS; SUBCUTANEOUS at 06:00

## 2024-03-22 RX ADMIN — Medication 30 MILLIGRAM(S): at 12:50

## 2024-03-22 NOTE — DISCHARGE NOTE OB - TEMPERATURE GREATER THAN 100.0  F ORALLY
Admission Status; Secondary Review Determination   Under the authority of the Utilization Management Committee, the utilization review process indicated a secondary review on Thomas Steve. The review outcome is based on review of the medical records, discussions with staff, and applying clinical experience noted on the date of the review.   (x) Inpatient Status Appropriate - This patient's medical care is consistent with medical management for inpatient care and reasonable inpatient medical practice.     RATIONALE FOR DETERMINATION   76 yr old male presented 7/13/22 to outside hospital with recent posterior laminectomy 6/19/22 noted to have new anemia 6.9 and buttock hematoma.  New DVT on warfarin since postop surgery as well.  Arrived at our facility just at midnight. Transfused total 2 units pRBC.  CKD3 with baseline Cr 1.15 but >4 and climbing today since presentation.  IVF and holding diuretic.  Assessed by IR for possible IVC filter as need to stop anticoagulation at this time.  Neurosurgery following pseudomeningocele to see if repeat surgery indicated as well.  At the time of admission with the information available to the attending physician more than 2 nights Hospital complex care was anticipated, based on patient risk of adverse outcome if treated as outpatient and complex care required. Inpatient admission is appropriate based on the Medicare guidelines.   The information on this document is developed by the utilization review team in order for the business office to ensure compliance. This only denotes the appropriateness of proper admission status and does not reflect the quality of care rendered.   The definitions of Inpatient Status and Observation Status used in making the determination above are those provided in the CMS Coverage Manual, Chapter 1 and Chapter 6, section 70.4.   Sincerely,   Fartun Borges MD  Utilization Review  Physician Advisor  [facility]    
Statement Selected

## 2024-03-22 NOTE — DISCHARGE NOTE OB - CARE PLAN
Principal Discharge DX:	 (normal spontaneous vaginal delivery)  Assessment and plan of treatment:	Continue breastfeeding.  Motrin as needed for pain.  Nothing per vagina x 6 weeks - no sex, tampons, douching, tub baths, etc.  Follow up in office in 5-6 weeks for check up   1 Principal Discharge DX:	 (normal spontaneous vaginal delivery)  Assessment and plan of treatment:	no sex nothing in vagina no heavy lifting no pushing no straining no strenuous activities  pain medication as needed; stool softener; dulcolax as needed if constipated  walk for exercise: helps recovery   continue prenatal vitamins daily especially whole course of breastfeeding  see your OB in the office for follow up post partum check 4-5weeks call the office to set up an appointment

## 2024-03-22 NOTE — DISCHARGE NOTE OB - REASON FOR ADMISSION
s/p  Patient presents for MIOL for NRFHT Patient presents for induction for non reassuring fetal tracing

## 2024-03-22 NOTE — DISCHARGE NOTE OB - CARE PROVIDER_API CALL
Cele Vyas  Obstetrics and Gynecology  [address] [place] [address]  Phone: [phone]  Fax: [phone]  Follow Up Time: 1 month

## 2024-03-22 NOTE — PROGRESS NOTE ADULT - PROBLEM SELECTOR PLAN 1
-Continue pain management  -Encourage OOB and ambulation  -Continue current care  -social work consult for h/o IUFD  -iron and vit c for anemia  -Plan for discharge tomorrow  -d/w Dr. Peacock

## 2024-03-22 NOTE — DISCHARGE NOTE OB - PATIENT PORTAL LINK FT
You can access the FollowMyHealth Patient Portal offered by [facility] by registering at the following website: [email or web address]. By joining [employer]’s FollowMyHealth portal, you will also be able to view your health information using other applications (apps) compatible with our system.

## 2024-03-23 VITALS
OXYGEN SATURATION: 98 % | TEMPERATURE: 98 F | RESPIRATION RATE: 18 BRPM | DIASTOLIC BLOOD PRESSURE: 70 MMHG | SYSTOLIC BLOOD PRESSURE: 111 MMHG | HEART RATE: 68 BPM

## 2024-03-23 PROCEDURE — 86900 BLOOD TYPING SEROLOGIC ABO: CPT

## 2024-03-23 PROCEDURE — 85384 FIBRINOGEN ACTIVITY: CPT

## 2024-03-23 PROCEDURE — 76819 FETAL BIOPHYS PROFIL W/O NST: CPT

## 2024-03-23 PROCEDURE — 85025 COMPLETE CBC W/AUTO DIFF WBC: CPT

## 2024-03-23 PROCEDURE — 85730 THROMBOPLASTIN TIME PARTIAL: CPT

## 2024-03-23 PROCEDURE — 90707 MMR VACCINE SC: CPT

## 2024-03-23 PROCEDURE — 85610 PROTHROMBIN TIME: CPT

## 2024-03-23 PROCEDURE — 59050 FETAL MONITOR W/REPORT: CPT

## 2024-03-23 PROCEDURE — 36415 COLL VENOUS BLD VENIPUNCTURE: CPT

## 2024-03-23 PROCEDURE — 87340 HEPATITIS B SURFACE AG IA: CPT

## 2024-03-23 PROCEDURE — 86780 TREPONEMA PALLIDUM: CPT

## 2024-03-23 PROCEDURE — 86850 RBC ANTIBODY SCREEN: CPT

## 2024-03-23 PROCEDURE — 86762 RUBELLA ANTIBODY: CPT

## 2024-03-23 PROCEDURE — 86901 BLOOD TYPING SEROLOGIC RH(D): CPT

## 2024-03-23 RX ORDER — FERROUS SULFATE 325(65) MG
1 TABLET ORAL
Qty: 0 | Refills: 0 | DISCHARGE

## 2024-03-23 RX ORDER — DOCUSATE SODIUM 100 MG
1 CAPSULE ORAL
Qty: 20 | Refills: 0
Start: 2024-03-23 | End: 2024-04-01

## 2024-03-23 RX ORDER — FAMOTIDINE 10 MG/ML
1 INJECTION INTRAVENOUS
Qty: 20 | Refills: 0
Start: 2024-03-23 | End: 2024-04-01

## 2024-03-23 RX ORDER — IBUPROFEN 200 MG
1 TABLET ORAL
Qty: 60 | Refills: 1
Start: 2024-03-23 | End: 2024-04-21

## 2024-03-23 RX ADMIN — Medication 600 MILLIGRAM(S): at 06:22

## 2024-03-23 RX ADMIN — Medication 600 MILLIGRAM(S): at 05:47

## 2024-03-23 RX ADMIN — SODIUM CHLORIDE 3 MILLILITER(S): 9 INJECTION INTRAMUSCULAR; INTRAVENOUS; SUBCUTANEOUS at 06:13

## 2024-03-23 RX ADMIN — Medication 0.5 MILLILITER(S): at 05:47

## 2024-03-23 NOTE — PROGRESS NOTE ADULT - SUBJECTIVE AND OBJECTIVE BOX
Patient seen at bedside resting comfortably offers no current complaints. Ambulating and voiding without difficulty. Passing flatus and tolerating regular diet. both breast/bottle feeding.  at bedside. Denies HA, CP, SOB, N/V/D, dizziness, palpitations,  worsening vaginal bleeding, or any other concerns.      Vital Signs Last 24 Hrs  T(C): 36.3 (22 Mar 2024 06:00), Max: 36.7 (21 Mar 2024 11:15)  T(F): 97.4 (22 Mar 2024 06:00), Max: 98.1 (21 Mar 2024 11:15)  HR: 72 (22 Mar 2024 06:00) (64 - 72)  BP: 130/86 (22 Mar 2024 06:00) (101/66 - 130/86)  BP(mean): 86 (21 Mar 2024 11:15) (86 - 86)  RR: 18 (22 Mar 2024 06:00) (14 - 18)  SpO2: 99% (22 Mar 2024 06:00) (98% - 99%)    Parameters below as of 22 Mar 2024 06:00  Patient On (Oxygen Delivery Method): room air        Physical Exam:     Gen: A&Ox 3, NAD  Chest: CTA B/L  Cardiac: S1,S2; RRR  Breast: Soft, nontender, nonengorged  Abdomen: +BS, Soft, nontender, ND; Fundus firm below umbilicus  Gyn: mod lochia  Ext: Nontender, DTRS 2+, no worsening edema                          10.4   8.66  )-----------( 209      ( 22 Mar 2024 07:22 )             33.7         
late entry due to acute pt care    PA NOTE:  PPD#2  s/p:     Patient seen at bedside resting comfortably offers no current complaints.  Ambulating and voiding without difficulty.  Passing flatus and tolerating regular diet.  both breast/bottle feeding.  Denies HA, CP, SOB, N/V/D, dizziness, palpitations, worsening abdominal pain, worsening vaginal bleeding, or any other concerns.      Vital Signs Last 24 Hrs  T(C): 36.6 (23 Mar 2024 06:13), Max: 36.6 (22 Mar 2024 19:02)  T(F): 97.9 (23 Mar 2024 06:13), Max: 97.9 (23 Mar 2024 06:13)  HR: 68 (23 Mar 2024 06:13) (68 - 76)  BP: 111/70 (23 Mar 2024 06:13) (111/70 - 125/87)  BP(mean): --  RR: 18 (23 Mar 2024 06:13) (18 - 18)  SpO2: 98% (23 Mar 2024 06:13) (97% - 98%)    Parameters below as of 23 Mar 2024 06:13  Patient On (Oxygen Delivery Method): room air        Physical Exam:     Gen: A&Ox 3, NAD  Chest: CTA B/L  Cardiac: S1+S2; RRR  Breast: Soft, nontender, nonengorged  Abdomen: +BS; Soft, nontender, ND; Fundus firm below umbilicus  Gyn: Min lochia  intact perineum   Ext: Nontender, DTRS 2+, no worsening edema                          10.4   8.66  )-----------( 209      ( 22 Mar 2024 07:22 )             33.7

## 2024-03-23 NOTE — PROGRESS NOTE ADULT - ASSESSMENT
PA NOTE:  PPD#2  s/p:     -Discharge home with instructions  -discharge class today  -Follow up routine ob check in of  -Breastfeeding encouraged   -d/w dr king
A/P: 28y admited for mIOl for NRFHT, PPD#1 s/p . Chronic anemia, pt asymptomatic. Pt stable.  -Continue pain management  -Encourage OOB and ambulation  -Continue current care  -social work consult for h/o IUFD  -iron and vit c for anemia  -Plan for discharge tomorrow  -d/w Dr. Peacock

## 2024-03-25 ENCOUNTER — APPOINTMENT (OUTPATIENT)
Dept: ANTEPARTUM | Facility: CLINIC | Age: 29
End: 2024-03-25

## 2024-04-01 ENCOUNTER — APPOINTMENT (OUTPATIENT)
Dept: ANTEPARTUM | Facility: CLINIC | Age: 29
End: 2024-04-01

## 2024-04-02 ENCOUNTER — EMERGENCY (EMERGENCY)
Facility: HOSPITAL | Age: 29
LOS: 1 days | Discharge: HOME | End: 2024-04-02
Attending: STUDENT IN AN ORGANIZED HEALTH CARE EDUCATION/TRAINING PROGRAM
Payer: MEDICAID

## 2024-04-02 VITALS
OXYGEN SATURATION: 99 % | WEIGHT: 139.11 LBS | SYSTOLIC BLOOD PRESSURE: 118 MMHG | HEIGHT: 60 IN | TEMPERATURE: 99 F | DIASTOLIC BLOOD PRESSURE: 79 MMHG | RESPIRATION RATE: 20 BRPM | HEART RATE: 62 BPM

## 2024-04-02 LAB
ALBUMIN SERPL ELPH-MCNC: 3.3 G/DL — LOW (ref 3.5–5)
ALP SERPL-CCNC: 76 U/L — SIGNIFICANT CHANGE UP (ref 40–120)
ALT FLD-CCNC: 23 U/L DA — SIGNIFICANT CHANGE UP (ref 10–60)
ANION GAP SERPL CALC-SCNC: 3 MMOL/L — LOW (ref 5–17)
APTT BLD: 35.5 SEC — SIGNIFICANT CHANGE UP (ref 24.5–35.6)
AST SERPL-CCNC: 15 U/L — SIGNIFICANT CHANGE UP (ref 10–40)
BASOPHILS # BLD AUTO: 0.03 K/UL — SIGNIFICANT CHANGE UP (ref 0–0.2)
BASOPHILS NFR BLD AUTO: 0.5 % — SIGNIFICANT CHANGE UP (ref 0–2)
BILIRUB SERPL-MCNC: 0.3 MG/DL — SIGNIFICANT CHANGE UP (ref 0.2–1.2)
BUN SERPL-MCNC: 8 MG/DL — SIGNIFICANT CHANGE UP (ref 7–18)
CALCIUM SERPL-MCNC: 9 MG/DL — SIGNIFICANT CHANGE UP (ref 8.4–10.5)
CHLORIDE SERPL-SCNC: 114 MMOL/L — HIGH (ref 96–108)
CO2 SERPL-SCNC: 25 MMOL/L — SIGNIFICANT CHANGE UP (ref 22–31)
CREAT SERPL-MCNC: 0.66 MG/DL — SIGNIFICANT CHANGE UP (ref 0.5–1.3)
EGFR: 122 ML/MIN/1.73M2 — SIGNIFICANT CHANGE UP
EOSINOPHIL # BLD AUTO: 0.14 K/UL — SIGNIFICANT CHANGE UP (ref 0–0.5)
EOSINOPHIL NFR BLD AUTO: 2.2 % — SIGNIFICANT CHANGE UP (ref 0–6)
GLUCOSE SERPL-MCNC: 82 MG/DL — SIGNIFICANT CHANGE UP (ref 70–99)
HCT VFR BLD CALC: 44.5 % — SIGNIFICANT CHANGE UP (ref 34.5–45)
HGB BLD-MCNC: 13.7 G/DL — SIGNIFICANT CHANGE UP (ref 11.5–15.5)
IMM GRANULOCYTES NFR BLD AUTO: 0 % — SIGNIFICANT CHANGE UP (ref 0–0.9)
INR BLD: 0.89 RATIO — SIGNIFICANT CHANGE UP (ref 0.85–1.18)
LYMPHOCYTES # BLD AUTO: 2.73 K/UL — SIGNIFICANT CHANGE UP (ref 1–3.3)
LYMPHOCYTES # BLD AUTO: 43.1 % — SIGNIFICANT CHANGE UP (ref 13–44)
MCHC RBC-ENTMCNC: 26.8 PG — LOW (ref 27–34)
MCHC RBC-ENTMCNC: 30.8 GM/DL — LOW (ref 32–36)
MCV RBC AUTO: 86.9 FL — SIGNIFICANT CHANGE UP (ref 80–100)
MONOCYTES # BLD AUTO: 0.51 K/UL — SIGNIFICANT CHANGE UP (ref 0–0.9)
MONOCYTES NFR BLD AUTO: 8 % — SIGNIFICANT CHANGE UP (ref 2–14)
NEUTROPHILS # BLD AUTO: 2.93 K/UL — SIGNIFICANT CHANGE UP (ref 1.8–7.4)
NEUTROPHILS NFR BLD AUTO: 46.2 % — SIGNIFICANT CHANGE UP (ref 43–77)
NRBC # BLD: 0 /100 WBCS — SIGNIFICANT CHANGE UP (ref 0–0)
PLATELET # BLD AUTO: 340 K/UL — SIGNIFICANT CHANGE UP (ref 150–400)
POTASSIUM SERPL-MCNC: 4 MMOL/L — SIGNIFICANT CHANGE UP (ref 3.5–5.3)
POTASSIUM SERPL-SCNC: 4 MMOL/L — SIGNIFICANT CHANGE UP (ref 3.5–5.3)
PROT SERPL-MCNC: 7.3 G/DL — SIGNIFICANT CHANGE UP (ref 6–8.3)
PROTHROM AB SERPL-ACNC: 10.2 SEC — SIGNIFICANT CHANGE UP (ref 9.5–13)
RBC # BLD: 5.12 M/UL — SIGNIFICANT CHANGE UP (ref 3.8–5.2)
RBC # FLD: 22.5 % — HIGH (ref 10.3–14.5)
SODIUM SERPL-SCNC: 142 MMOL/L — SIGNIFICANT CHANGE UP (ref 135–145)
WBC # BLD: 6.34 K/UL — SIGNIFICANT CHANGE UP (ref 3.8–10.5)
WBC # FLD AUTO: 6.34 K/UL — SIGNIFICANT CHANGE UP (ref 3.8–10.5)

## 2024-04-02 PROCEDURE — 76830 TRANSVAGINAL US NON-OB: CPT | Mod: 26

## 2024-04-02 PROCEDURE — 36415 COLL VENOUS BLD VENIPUNCTURE: CPT

## 2024-04-02 PROCEDURE — 80053 COMPREHEN METABOLIC PANEL: CPT

## 2024-04-02 PROCEDURE — 99285 EMERGENCY DEPT VISIT HI MDM: CPT

## 2024-04-02 PROCEDURE — 85025 COMPLETE CBC W/AUTO DIFF WBC: CPT

## 2024-04-02 PROCEDURE — 85610 PROTHROMBIN TIME: CPT

## 2024-04-02 PROCEDURE — 86850 RBC ANTIBODY SCREEN: CPT

## 2024-04-02 PROCEDURE — 99284 EMERGENCY DEPT VISIT MOD MDM: CPT | Mod: 25

## 2024-04-02 PROCEDURE — 85730 THROMBOPLASTIN TIME PARTIAL: CPT

## 2024-04-02 PROCEDURE — 76856 US EXAM PELVIC COMPLETE: CPT

## 2024-04-02 PROCEDURE — 86900 BLOOD TYPING SEROLOGIC ABO: CPT

## 2024-04-02 PROCEDURE — 86901 BLOOD TYPING SEROLOGIC RH(D): CPT

## 2024-04-02 PROCEDURE — 76856 US EXAM PELVIC COMPLETE: CPT | Mod: 26

## 2024-04-02 PROCEDURE — 76830 TRANSVAGINAL US NON-OB: CPT

## 2024-04-02 NOTE — ED PROVIDER NOTE - NSFOLLOWUPINSTRUCTIONS_ED_ALL_ED_FT
You were seen in the emergency department for vaginal bleeding after pregnancy.     Please follow-up with your OBGYN.    If you have any worsening symptoms, severe headache, chest pain, trouble breathing, dizziness, palpitations, please return to the emergency department.

## 2024-04-02 NOTE — CONSULT NOTE ADULT - SUBJECTIVE AND OBJECTIVE BOX
HPI: 28y  Presents ppd #12 s/p  on 3/21/247 with complaints of increased vaginal bleeding. Pt states that starting  she started to have heavy vaginal bleeding with clots. Pt ststes that she has to change her pad every hour. Pt denies strenuous activity. Denies vaginal discharge, pelvic pain, abdominal pain, chest pain, shortness of breath, dizziness, palpitations, n/v/d/c, fever, chills or any other complaints     OBHx:   TOP by D&C x1  TOP by medical x1    x4   IUFD still birth @ 28w   GynHx:Pt denies std's, abnormal pap smear, fibroids or ovarian cysts  Pmhx: chronic anemia  Pshx: D&C   Allergies: Denies  Meds: Ibuprofen prn, Iron, PNV   Socialhx: Pt denies etoh/drug/tobacco use  Psych: denies anxiety or depression    REVIEW OF SYSTEMS: see HPI	    PE:  Vital Signs Last 24 Hrs  T(C): 37 (2024 13:22), Max: 37 (2024 13:22)  T(F): 98.6 (2024 13:22), Max: 98.6 (2024 13:22)  HR: 62 (2024 13:22) (62 - 62)  BP: 118/79 (2024 13:22) (118/79 - 118/79)  BP(mean): --  RR: 20 (2024 13:22) (20 - 20)  SpO2: 99% (2024 13:22) (99% - 99%)    Parameters below as of 2024 13:22  Patient On (Oxygen Delivery Method): room air      Gen: A&Ox3, NAD  abd: +bs; soft, nt, nd, no rebound or guarding;   pelvis: moderate lochia, no clots, no cervical motion tenderness; cervix closed, no abnormal discharge; no odor, no uterine tenderness; mobile. No adnexal tenderness or masses appreciated b/l     LABS:                        13.7   6.34  )-----------( 340      ( 2024 15:40 )             44.5     04-02    142  |  114<H>  |  8   ----------------------------<  82  4.0   |  25  |  0.66    Ca    9.0      2024 15:40    TPro  7.3  /  Alb  3.3<L>  /  TBili  0.3  /  DBili  x   /  AST  15  /  ALT  23  /  AlkPhos  76  04-02    PT/INR - ( 2024 15:40 )   PT: 10.2 sec;   INR: 0.89 ratio         PTT - ( 2024 15:40 )  PTT:35.5 sec  Urinalysis Basic - ( 2024 15:40 )    Color: x / Appearance: x / SG: x / pH: x  Gluc: 82 mg/dL / Ketone: x  / Bili: x / Urobili: x   Blood: x / Protein: x / Nitrite: x   Leuk Esterase: x / RBC: x / WBC x   Sq Epi: x / Non Sq Epi: x / Bacteria: x        RADIOLOGY & ADDITIONAL STUDIES:  < from: US Pelvis Complete (US Pelvis Complete .) (24 @ 15:39) >  INTERPRETATION:  CLINICAL INFORMATION: Postpartum vaginal bleeding    LMP: Delivery 2 weeks ago.    COMPARISON: None available.    TECHNIQUE:  Endovaginal and transabdominal pelvic sonogram. Color and Spectral   Doppler was performed.    FINDINGS:  Uterus: 11.7 cm x 7.6 cm x 9.1 cm. Within normal limits.  Endometrium: 17 mm which is thickened.    Right ovary: 2.7 cm x 1.3 cm x 1.4 cm. Within normal limits. Duplex   Doppler flow is demonstrated for the right ovary.  Left ovary: 2.9 cm x 1.8 cm x 1.7 cm. Within normal limits. Duplex   Doppler flow is demonstrated for the left ovary.    Fluid: No pelvic free fluid.    IMPRESSION:  Thickened endometrium for which retained products of conception cannot be   excluded.    --- End of Report ---      < end of copied text >

## 2024-04-02 NOTE — ED PROVIDER NOTE - PATIENT PORTAL LINK FT
You can access the FollowMyHealth Patient Portal offered by [facility] by registering at the following website: [email or web address]. By joining [facility]’s FollowMyHealth portal, you will also be able to view your health information using other applications (apps) compatible with our system.

## 2024-04-02 NOTE — ED PROVIDER NOTE - CLINICAL SUMMARY MEDICAL DECISION MAKING FREE TEXT BOX
28-year-old female presenting with vaginal bleeding s/p  approximately 2 weeks ago.  Concern for retained products of conception's will obtain labs to assess for anemia and ultrasound.

## 2024-04-02 NOTE — ED ADULT NURSE NOTE - OBJECTIVE STATEMENT
Patient came in postpartum 10 days, complaints of vaginal bleeding x3 days with clots. +Abdominal cramps. denies dizziness, N&V

## 2024-04-02 NOTE — ED PROVIDER NOTE - OBJECTIVE STATEMENT
28-year-old female, 12 days s/p , presenting with vaginal bleeding.  Patient reports since the delivery she has had vaginal bleeding on and off but for the past 3 days she has had a large amount of clotting.  Denies any abdominal pain, fever, chest pain or trouble breathing.  Reports she has previously had anemia with this pregnancy and needed a blood transfusion.

## 2024-04-02 NOTE — ED PROVIDER NOTE - PROGRESS NOTE DETAILS
gyn consulted 2/2 concern for retained products on US. Drew Cruz patient seen and cleared by obgyn. stable for outpatient followup. Drew Cruz

## 2024-04-02 NOTE — CONSULT NOTE ADULT - ASSESSMENT
A/P 27 yo  present to the ED with delayed PPH, pt is stable     - Sono reviewed with pt   - Vaginal cytotec 400 mcg placed in posterior fornix,  pt tolerated well  - Pt instructed to take additional 400 mcg of cytotec buccal in 4 hours   - Bleeding precautions discussed with pt, and return to ED precautions discussed    - Pt to follow up with OB for routine postpartum visit     D/W Dr Dawn

## 2024-04-02 NOTE — CONSULT NOTE ADULT - PROVIDER SPECIALTY LIST ADULT
Fever 4 days ago with headache, Last night pt developed itchy red rash to bilat feet, +cough and runny nose, no nvd   OB

## 2024-04-02 NOTE — ED ADULT TRIAGE NOTE - CHIEF COMPLAINT QUOTE
PT REPORTS S/P  ON 1 1/2 WEEK AGO. C/O HEAVY VAGINAL BLEEDING WITH CLOTS X 3 DAYS. + ABDOMINAL CRAMPS. DENIES DIZZINESS

## 2024-05-02 ENCOUNTER — OUTPATIENT (OUTPATIENT)
Dept: OUTPATIENT SERVICES | Facility: HOSPITAL | Age: 29
LOS: 1 days | End: 2024-05-02
Payer: MEDICAID

## 2024-05-02 ENCOUNTER — APPOINTMENT (OUTPATIENT)
Dept: OBGYN | Facility: CLINIC | Age: 29
End: 2024-05-02

## 2024-05-02 ENCOUNTER — APPOINTMENT (OUTPATIENT)
Dept: OBGYN | Facility: CLINIC | Age: 29
End: 2024-05-02
Payer: MEDICAID

## 2024-05-02 VITALS
RESPIRATION RATE: 18 BRPM | HEART RATE: 70 BPM | BODY MASS INDEX: 27.61 KG/M2 | WEIGHT: 146.25 LBS | SYSTOLIC BLOOD PRESSURE: 101 MMHG | TEMPERATURE: 98.1 F | OXYGEN SATURATION: 100 % | HEIGHT: 61 IN | DIASTOLIC BLOOD PRESSURE: 70 MMHG

## 2024-05-02 DIAGNOSIS — Z34.00 ENCOUNTER FOR SUPERVISION OF NORMAL FIRST PREGNANCY, UNSPECIFIED TRIMESTER: ICD-10-CM

## 2024-05-02 DIAGNOSIS — Z30.09 ENCOUNTER FOR OTHER GENERAL COUNSELING AND ADVICE ON CONTRACEPTION: ICD-10-CM

## 2024-05-02 PROCEDURE — 87491 CHLMYD TRACH DNA AMP PROBE: CPT

## 2024-05-02 PROCEDURE — G0463: CPT

## 2024-05-02 PROCEDURE — 87591 N.GONORRHOEAE DNA AMP PROB: CPT

## 2024-05-02 PROCEDURE — 99213 OFFICE O/P EST LOW 20 MIN: CPT | Mod: TH

## 2024-05-02 NOTE — HISTORY OF PRESENT ILLNESS
[Postpartum Follow Up] : postpartum follow up [Delivery Date: ___] : on [unfilled] [] : delivered by vaginal delivery [Female] : Delivery History: baby girl [Wt. ___] : weighing [unfilled] [Discharge HCT: ___] : hematocrit level was [unfilled] [Discharge HGB: ___] : hemoglobin level was [unfilled] [Resumed Menses] : has resumed her menses [Resumed [place]] : has resumed intercourse [Breastfeeding] : not currently nursing [Intended Contraception] : the patient does not intended to use contraception postpartum [Back to Normal] : is back to normal in size [Normal] : the vagina was normal [Cervix Sample Taken] : cervical sample taken for a Pap smear [Examination Of The Breasts] : breasts are normal [Doing Well] : is doing well [No Sign of Infection] : is showing no signs of infection [None] : None

## 2024-05-03 DIAGNOSIS — Z30.09 ENCOUNTER FOR OTHER GENERAL COUNSELING AND ADVICE ON CONTRACEPTION: ICD-10-CM

## 2024-05-03 LAB
C TRACH RRNA SPEC QL NAA+PROBE: NOT DETECTED
N GONORRHOEA RRNA SPEC QL NAA+PROBE: NOT DETECTED
SOURCE TP AMPLIFICATION: NORMAL

## 2024-05-09 LAB — CYTOLOGY CVX/VAG DOC THIN PREP: NORMAL

## 2024-06-13 ENCOUNTER — APPOINTMENT (OUTPATIENT)
Dept: OBGYN | Facility: CLINIC | Age: 29
End: 2024-06-13

## 2024-06-17 ENCOUNTER — APPOINTMENT (OUTPATIENT)
Dept: OBGYN | Facility: CLINIC | Age: 29
End: 2024-06-17

## 2024-08-01 ENCOUNTER — APPOINTMENT (OUTPATIENT)
Dept: OBGYN | Facility: CLINIC | Age: 29
End: 2024-08-01

## 2024-08-04 NOTE — DISCHARGE NOTE ANTEPARTUM - MEDICATION SUMMARY - MEDICATIONS TO STOP TAKING
Concern for possible intraabdominal pathology possibly cholecystitis vs pancreatitis r/o colitis.  Follow up labs, Will obtain ct a/p.  Repeat UA and urine culture, and reassess. I will STOP taking the medications listed below when I get home from the hospital:  None

## 2024-08-22 ENCOUNTER — NON-APPOINTMENT (OUTPATIENT)
Age: 29
End: 2024-08-22

## 2024-08-29 ENCOUNTER — APPOINTMENT (OUTPATIENT)
Dept: OBGYN | Facility: CLINIC | Age: 29
End: 2024-08-29

## 2024-08-29 ENCOUNTER — OUTPATIENT (OUTPATIENT)
Dept: OUTPATIENT SERVICES | Facility: HOSPITAL | Age: 29
LOS: 1 days | End: 2024-08-29
Payer: MEDICAID

## 2024-08-29 VITALS
OXYGEN SATURATION: 99 % | TEMPERATURE: 97.1 F | DIASTOLIC BLOOD PRESSURE: 73 MMHG | BODY MASS INDEX: 28.13 KG/M2 | HEART RATE: 64 BPM | SYSTOLIC BLOOD PRESSURE: 106 MMHG | WEIGHT: 149 LBS | HEIGHT: 61 IN

## 2024-08-29 DIAGNOSIS — Z00.00 ENCOUNTER FOR GENERAL ADULT MEDICAL EXAMINATION WITHOUT ABNORMAL FINDINGS: ICD-10-CM

## 2024-08-29 DIAGNOSIS — Z00.00 ENCOUNTER FOR GENERAL ADULT MEDICAL EXAMINATION W/OUT ABNORMAL FINDINGS: ICD-10-CM

## 2024-08-29 PROCEDURE — G0463: CPT

## 2024-08-29 PROCEDURE — 85025 COMPLETE CBC W/AUTO DIFF WBC: CPT

## 2024-08-29 PROCEDURE — 80053 COMPREHEN METABOLIC PANEL: CPT

## 2024-08-29 PROCEDURE — 99212 OFFICE O/P EST SF 10 MIN: CPT

## 2024-08-29 NOTE — HISTORY OF PRESENT ILLNESS
[N] : Patient does not use contraception [Y] : Patient is sexually active [LMPDate] : 05/2/24 [FreeTextEntry1] : PT is here requesting blood work for work

## 2024-09-03 ENCOUNTER — TRANSCRIPTION ENCOUNTER (OUTPATIENT)
Age: 29
End: 2024-09-03

## 2024-09-05 LAB
ALBUMIN SERPL ELPH-MCNC: 4.3 G/DL
ALP BLD-CCNC: 50 U/L
ALT SERPL-CCNC: 9 U/L
ANION GAP SERPL CALC-SCNC: 9 MMOL/L
AST SERPL-CCNC: 15 U/L
BASOPHILS # BLD AUTO: 0.03 K/UL
BASOPHILS NFR BLD AUTO: 0.6 %
BILIRUB SERPL-MCNC: 0.3 MG/DL
BUN SERPL-MCNC: 6 MG/DL
CALCIUM SERPL-MCNC: 9 MG/DL
CHLORIDE SERPL-SCNC: 108 MMOL/L
CO2 SERPL-SCNC: 24 MMOL/L
CREAT SERPL-MCNC: 0.58 MG/DL
EGFR: 126 ML/MIN/1.73M2
EOSINOPHIL # BLD AUTO: 0.33 K/UL
EOSINOPHIL NFR BLD AUTO: 6.2 %
GLUCOSE SERPL-MCNC: 90 MG/DL
HCT VFR BLD CALC: 42.8 %
HGB BLD-MCNC: 13.7 G/DL
IMM GRANULOCYTES NFR BLD AUTO: 0.2 %
LYMPHOCYTES # BLD AUTO: 2.34 K/UL
LYMPHOCYTES NFR BLD AUTO: 44.1 %
MAN DIFF?: NORMAL
MCHC RBC-ENTMCNC: 30.2 PG
MCHC RBC-ENTMCNC: 32 GM/DL
MCV RBC AUTO: 94.5 FL
MONOCYTES # BLD AUTO: 0.42 K/UL
MONOCYTES NFR BLD AUTO: 7.9 %
NEUTROPHILS # BLD AUTO: 2.18 K/UL
NEUTROPHILS NFR BLD AUTO: 41 %
PLATELET # BLD AUTO: 224 K/UL
POTASSIUM SERPL-SCNC: 4.7 MMOL/L
PROT SERPL-MCNC: 6.8 G/DL
RBC # BLD: 4.53 M/UL
RBC # FLD: 12.5 %
SODIUM SERPL-SCNC: 141 MMOL/L
WBC # FLD AUTO: 5.31 K/UL

## 2024-09-18 NOTE — ED ADULT NURSE NOTE - AS PAIN REST
5 (moderate pain)

## 2024-12-05 ENCOUNTER — APPOINTMENT (OUTPATIENT)
Dept: INTERNAL MEDICINE | Facility: CLINIC | Age: 29
End: 2024-12-05

## 2025-01-27 ENCOUNTER — APPOINTMENT (OUTPATIENT)
Dept: OBGYN | Facility: CLINIC | Age: 30
End: 2025-01-27
Payer: MEDICAID

## 2025-01-27 ENCOUNTER — NON-APPOINTMENT (OUTPATIENT)
Age: 30
End: 2025-01-27

## 2025-01-27 ENCOUNTER — OUTPATIENT (OUTPATIENT)
Dept: OUTPATIENT SERVICES | Facility: HOSPITAL | Age: 30
LOS: 1 days | End: 2025-01-27
Payer: MEDICAID

## 2025-01-27 VITALS
HEIGHT: 61 IN | HEART RATE: 89 BPM | RESPIRATION RATE: 16 BRPM | WEIGHT: 157 LBS | DIASTOLIC BLOOD PRESSURE: 77 MMHG | TEMPERATURE: 98.4 F | OXYGEN SATURATION: 100 % | BODY MASS INDEX: 29.64 KG/M2 | SYSTOLIC BLOOD PRESSURE: 111 MMHG

## 2025-01-27 DIAGNOSIS — Z34.00 ENCOUNTER FOR SUPERVISION OF NORMAL FIRST PREGNANCY, UNSPECIFIED TRIMESTER: ICD-10-CM

## 2025-01-27 DIAGNOSIS — O09.91 SUPERVISION OF HIGH RISK PREGNANCY, UNSPECIFIED, FIRST TRIMESTER: ICD-10-CM

## 2025-01-27 PROCEDURE — 87340 HEPATITIS B SURFACE AG IA: CPT

## 2025-01-27 PROCEDURE — 81025 URINE PREGNANCY TEST: CPT

## 2025-01-27 PROCEDURE — 83020 HEMOGLOBIN ELECTROPHORESIS: CPT

## 2025-01-27 PROCEDURE — 86900 BLOOD TYPING SEROLOGIC ABO: CPT

## 2025-01-27 PROCEDURE — 87086 URINE CULTURE/COLONY COUNT: CPT

## 2025-01-27 PROCEDURE — 83036 HEMOGLOBIN GLYCOSYLATED A1C: CPT

## 2025-01-27 PROCEDURE — 86850 RBC ANTIBODY SCREEN: CPT

## 2025-01-27 PROCEDURE — 81420 FETAL CHRMOML ANEUPLOIDY: CPT

## 2025-01-27 PROCEDURE — 86787 VARICELLA-ZOSTER ANTIBODY: CPT

## 2025-01-27 PROCEDURE — 85025 COMPLETE CBC W/AUTO DIFF WBC: CPT

## 2025-01-27 PROCEDURE — 86762 RUBELLA ANTIBODY: CPT

## 2025-01-27 PROCEDURE — 83655 ASSAY OF LEAD: CPT

## 2025-01-27 PROCEDURE — 86765 RUBEOLA ANTIBODY: CPT

## 2025-01-27 PROCEDURE — 86480 TB TEST CELL IMMUN MEASURE: CPT

## 2025-01-27 PROCEDURE — 86780 TREPONEMA PALLIDUM: CPT

## 2025-01-27 PROCEDURE — 86803 HEPATITIS C AB TEST: CPT

## 2025-01-27 PROCEDURE — 81003 URINALYSIS AUTO W/O SCOPE: CPT

## 2025-01-27 PROCEDURE — 99214 OFFICE O/P EST MOD 30 MIN: CPT | Mod: TH

## 2025-01-27 PROCEDURE — G0463: CPT

## 2025-01-27 PROCEDURE — 87389 HIV-1 AG W/HIV-1&-2 AB AG IA: CPT

## 2025-01-27 RX ORDER — FOLIC ACID/MULTIVIT,IRON,MINER 0.4MG-18MG
200 TABLET ORAL
Qty: 90 | Refills: 2 | Status: ACTIVE | COMMUNITY
Start: 2025-01-27 | End: 1900-01-01

## 2025-01-28 DIAGNOSIS — O09.91 SUPERVISION OF HIGH RISK PREGNANCY, UNSPECIFIED, FIRST TRIMESTER: ICD-10-CM

## 2025-01-28 DIAGNOSIS — Z3A.10 10 WEEKS GESTATION OF PREGNANCY: ICD-10-CM

## 2025-01-30 LAB
ABO + RH PNL BLD: NORMAL
BACTERIA UR CULT: NORMAL
BASOPHILS # BLD AUTO: 0.02 K/UL
BASOPHILS NFR BLD AUTO: 0.3 %
BLD GP AB SCN SERPL QL: NORMAL
EOSINOPHIL # BLD AUTO: 0.16 K/UL
EOSINOPHIL NFR BLD AUTO: 2.6 %
ESTIMATED AVERAGE GLUCOSE: 114 MG/DL
HBA1C MFR BLD HPLC: 5.6 %
HBV SURFACE AG SER QL: NONREACTIVE
HCT VFR BLD CALC: 39 %
HCV AB SER QL: NONREACTIVE
HCV S/CO RATIO: 0.1 S/CO
HGB A MFR BLD: 97.7 %
HGB A2 MFR BLD: 2.3 %
HGB BLD-MCNC: 11.7 G/DL
HGB FRACT BLD-IMP: NORMAL
HIV1+2 AB SPEC QL IA.RAPID: NONREACTIVE
IMM GRANULOCYTES NFR BLD AUTO: 0.2 %
LEAD BLD-MCNC: <1 UG/DL
LYMPHOCYTES # BLD AUTO: 2.14 K/UL
LYMPHOCYTES NFR BLD AUTO: 34.9 %
MAN DIFF?: NORMAL
MCHC RBC-ENTMCNC: 23.1 PG
MCHC RBC-ENTMCNC: 30 G/DL
MCV RBC AUTO: 77.1 FL
MEV IGG FLD QL IA: >300 AU/ML
MEV IGG+IGM SER-IMP: POSITIVE
MONOCYTES # BLD AUTO: 0.52 K/UL
MONOCYTES NFR BLD AUTO: 8.5 %
NEUTROPHILS # BLD AUTO: 3.28 K/UL
NEUTROPHILS NFR BLD AUTO: 53.5 %
PLATELET # BLD AUTO: 319 K/UL
RBC # BLD: 5.06 M/UL
RBC # FLD: 16.5 %
RUBV IGG FLD-ACNC: 2.86 INDEX
RUBV IGG SER-IMP: POSITIVE
T PALLIDUM AB SER QL IA: NEGATIVE
VZV AB TITR SER: POSITIVE
VZV IGG SER IF-ACNC: 5.13 S/CO
WBC # FLD AUTO: 6.13 K/UL

## 2025-02-03 LAB
CHROMOSOME13 INTERPRETATION: NORMAL
CHROMOSOME13 TEST RESULT: NORMAL
CHROMOSOME18 INTERPRETATION: NORMAL
CHROMOSOME18 TEST RESULT: NORMAL
CHROMOSOME21 INTERPRETATION: NORMAL
CHROMOSOME21 TEST RESULT: NORMAL
FETAL FRACTION: NORMAL
M TB IFN-G BLD-IMP: NEGATIVE
PERFORMANCE AND LIMITATIONS: NORMAL
QUANTIFERON TB PLUS MITOGEN MINUS NIL: >10 IU/ML
QUANTIFERON TB PLUS NIL: 0.01 IU/ML
QUANTIFERON TB PLUS TB1 MINUS NIL: 0 IU/ML
QUANTIFERON TB PLUS TB2 MINUS NIL: 0 IU/ML
SEX CHROMOSOME INTERPRETATION: NORMAL
SEX CHROMOSOME TEST RESULT: NORMAL
VERIFI PRENATAL TEST: NOT DETECTED

## 2025-02-10 ENCOUNTER — APPOINTMENT (OUTPATIENT)
Dept: ANTEPARTUM | Facility: CLINIC | Age: 30
End: 2025-02-10

## 2025-02-21 ENCOUNTER — NON-APPOINTMENT (OUTPATIENT)
Age: 30
End: 2025-02-21

## 2025-02-24 ENCOUNTER — APPOINTMENT (OUTPATIENT)
Dept: OBGYN | Facility: CLINIC | Age: 30
End: 2025-02-24